# Patient Record
Sex: FEMALE | Race: WHITE | NOT HISPANIC OR LATINO | Employment: UNEMPLOYED | URBAN - METROPOLITAN AREA
[De-identification: names, ages, dates, MRNs, and addresses within clinical notes are randomized per-mention and may not be internally consistent; named-entity substitution may affect disease eponyms.]

---

## 2019-10-05 ENCOUNTER — HOSPITAL ENCOUNTER (INPATIENT)
Facility: HOSPITAL | Age: 49
LOS: 5 days | Discharge: HOME | End: 2019-10-10
Attending: EMERGENCY MEDICINE | Admitting: HOSPITALIST

## 2019-10-05 ENCOUNTER — APPOINTMENT (OUTPATIENT)
Dept: CT IMAGING | Facility: HOSPITAL | Age: 49
End: 2019-10-05

## 2019-10-05 DIAGNOSIS — N20.1 URETERAL CALCULUS, RIGHT: ICD-10-CM

## 2019-10-05 DIAGNOSIS — T39.1X1A ACCIDENTAL ACETAMINOPHEN OVERDOSE, INITIAL ENCOUNTER: ICD-10-CM

## 2019-10-05 DIAGNOSIS — N23 RENAL COLIC ON RIGHT SIDE: Primary | ICD-10-CM

## 2019-10-05 DIAGNOSIS — R79.89 LFT ELEVATION: ICD-10-CM

## 2019-10-05 DIAGNOSIS — K75.9 HEPATITIS: ICD-10-CM

## 2019-10-05 LAB
ALBUMIN SERPL-MCNC: 4.2 G/DL (ref 3.5–5.2)
ALBUMIN/GLOB SERPL: 1.4 G/DL
ALP SERPL-CCNC: 148 U/L (ref 39–117)
ALT SERPL W P-5'-P-CCNC: 431 U/L (ref 1–33)
AMORPH URATE CRY URNS QL MICRO: ABNORMAL /HPF
ANION GAP SERPL CALCULATED.3IONS-SCNC: 14.8 MMOL/L (ref 5–15)
APAP SERPL-MCNC: 10 MCG/ML (ref 10–30)
AST SERPL-CCNC: 620 U/L (ref 1–32)
BACTERIA UR QL AUTO: ABNORMAL /HPF
BASOPHILS # BLD AUTO: 0.01 10*3/MM3 (ref 0–0.2)
BASOPHILS NFR BLD AUTO: 0.2 % (ref 0–1.5)
BILIRUB SERPL-MCNC: 0.7 MG/DL (ref 0.2–1.2)
BILIRUB UR QL STRIP: NEGATIVE
BUN BLD-MCNC: 8 MG/DL (ref 6–20)
BUN/CREAT SERPL: 6.2 (ref 7–25)
CALCIUM SPEC-SCNC: 8.9 MG/DL (ref 8.6–10.5)
CHLORIDE SERPL-SCNC: 102 MMOL/L (ref 98–107)
CLARITY UR: ABNORMAL
CO2 SERPL-SCNC: 20.2 MMOL/L (ref 22–29)
COLOR UR: ABNORMAL
CREAT BLD-MCNC: 1.29 MG/DL (ref 0.57–1)
DEPRECATED RDW RBC AUTO: 42.1 FL (ref 37–54)
EOSINOPHIL # BLD AUTO: 0.43 10*3/MM3 (ref 0–0.4)
EOSINOPHIL NFR BLD AUTO: 9.8 % (ref 0.3–6.2)
ERYTHROCYTE [DISTWIDTH] IN BLOOD BY AUTOMATED COUNT: 14 % (ref 12.3–15.4)
GFR SERPL CREATININE-BSD FRML MDRD: 44 ML/MIN/1.73
GLOBULIN UR ELPH-MCNC: 3 GM/DL
GLUCOSE BLD-MCNC: 108 MG/DL (ref 65–99)
GLUCOSE UR STRIP-MCNC: ABNORMAL MG/DL
HAV IGM SERPL QL IA: NORMAL
HBV CORE IGM SERPL QL IA: NORMAL
HBV SURFACE AG SERPL QL IA: NORMAL
HCT VFR BLD AUTO: 41.9 % (ref 34–46.6)
HCV AB SER DONR QL: NORMAL
HGB BLD-MCNC: 13.4 G/DL (ref 12–15.9)
HGB UR QL STRIP.AUTO: ABNORMAL
HYALINE CASTS UR QL AUTO: ABNORMAL /LPF
IMM GRANULOCYTES # BLD AUTO: 0.03 10*3/MM3 (ref 0–0.05)
IMM GRANULOCYTES NFR BLD AUTO: 0.7 % (ref 0–0.5)
INR PPP: 0.99 (ref 0.9–1.1)
KETONES UR QL STRIP: ABNORMAL
LEUKOCYTE ESTERASE UR QL STRIP.AUTO: ABNORMAL
LIPASE SERPL-CCNC: 20 U/L (ref 13–60)
LYMPHOCYTES # BLD AUTO: 0.85 10*3/MM3 (ref 0.7–3.1)
LYMPHOCYTES NFR BLD AUTO: 19.3 % (ref 19.6–45.3)
MCH RBC QN AUTO: 26.6 PG (ref 26.6–33)
MCHC RBC AUTO-ENTMCNC: 32 G/DL (ref 31.5–35.7)
MCV RBC AUTO: 83.1 FL (ref 79–97)
MONOCYTES # BLD AUTO: 0.38 10*3/MM3 (ref 0.1–0.9)
MONOCYTES NFR BLD AUTO: 8.6 % (ref 5–12)
NEUTROPHILS # BLD AUTO: 2.71 10*3/MM3 (ref 1.7–7)
NEUTROPHILS NFR BLD AUTO: 61.4 % (ref 42.7–76)
NITRITE UR QL STRIP: POSITIVE
NRBC BLD AUTO-RTO: 0 /100 WBC (ref 0–0.2)
PH UR STRIP.AUTO: 6.5 [PH] (ref 5–8)
PLATELET # BLD AUTO: 209 10*3/MM3 (ref 140–450)
PMV BLD AUTO: 9.4 FL (ref 6–12)
POTASSIUM BLD-SCNC: 4 MMOL/L (ref 3.5–5.2)
PROT SERPL-MCNC: 7.2 G/DL (ref 6–8.5)
PROT UR QL STRIP: ABNORMAL
PROTHROMBIN TIME: 12.8 SECONDS (ref 11.7–14.2)
RBC # BLD AUTO: 5.04 10*6/MM3 (ref 3.77–5.28)
RBC # UR: ABNORMAL /HPF
REF LAB TEST METHOD: ABNORMAL
SODIUM BLD-SCNC: 137 MMOL/L (ref 136–145)
SP GR UR STRIP: 1.02 (ref 1–1.03)
SQUAMOUS #/AREA URNS HPF: ABNORMAL /HPF
UROBILINOGEN UR QL STRIP: ABNORMAL
WBC NRBC COR # BLD: 4.41 10*3/MM3 (ref 3.4–10.8)
WBC UR QL AUTO: ABNORMAL /HPF

## 2019-10-05 PROCEDURE — 80074 ACUTE HEPATITIS PANEL: CPT | Performed by: EMERGENCY MEDICINE

## 2019-10-05 PROCEDURE — 85025 COMPLETE CBC W/AUTO DIFF WBC: CPT | Performed by: EMERGENCY MEDICINE

## 2019-10-05 PROCEDURE — 25010000002 KETOROLAC TROMETHAMINE PER 15 MG: Performed by: EMERGENCY MEDICINE

## 2019-10-05 PROCEDURE — 99284 EMERGENCY DEPT VISIT MOD MDM: CPT

## 2019-10-05 PROCEDURE — 83690 ASSAY OF LIPASE: CPT | Performed by: EMERGENCY MEDICINE

## 2019-10-05 PROCEDURE — 80053 COMPREHEN METABOLIC PANEL: CPT | Performed by: EMERGENCY MEDICINE

## 2019-10-05 PROCEDURE — 74176 CT ABD & PELVIS W/O CONTRAST: CPT

## 2019-10-05 PROCEDURE — 25010000003 ACETYLCYSTEINE PER 100 MG: Performed by: EMERGENCY MEDICINE

## 2019-10-05 PROCEDURE — 81001 URINALYSIS AUTO W/SCOPE: CPT | Performed by: EMERGENCY MEDICINE

## 2019-10-05 PROCEDURE — 25010000002 ONDANSETRON PER 1 MG: Performed by: EMERGENCY MEDICINE

## 2019-10-05 PROCEDURE — 25010000002 DIPHENHYDRAMINE PER 50 MG

## 2019-10-05 PROCEDURE — 85610 PROTHROMBIN TIME: CPT | Performed by: EMERGENCY MEDICINE

## 2019-10-05 PROCEDURE — 80307 DRUG TEST PRSMV CHEM ANLYZR: CPT | Performed by: EMERGENCY MEDICINE

## 2019-10-05 RX ORDER — KETOROLAC TROMETHAMINE 30 MG/ML
30 INJECTION, SOLUTION INTRAMUSCULAR; INTRAVENOUS ONCE
Status: COMPLETED | OUTPATIENT
Start: 2019-10-05 | End: 2019-10-05

## 2019-10-05 RX ORDER — DIPHENHYDRAMINE HCL 50 MG
50 CAPSULE ORAL NIGHTLY PRN
COMMUNITY
End: 2019-11-12

## 2019-10-05 RX ORDER — SULFAMETHOXAZOLE AND TRIMETHOPRIM 800; 160 MG/1; MG/1
1 TABLET ORAL 2 TIMES DAILY
COMMUNITY
Start: 2019-09-30 | End: 2019-10-10 | Stop reason: HOSPADM

## 2019-10-05 RX ORDER — PHENAZOPYRIDINE HYDROCHLORIDE 200 MG/1
200 TABLET, FILM COATED ORAL 3 TIMES DAILY
COMMUNITY
Start: 2019-09-30 | End: 2019-10-22

## 2019-10-05 RX ORDER — DIPHENHYDRAMINE HYDROCHLORIDE 50 MG/ML
25 INJECTION INTRAMUSCULAR; INTRAVENOUS ONCE
Status: COMPLETED | OUTPATIENT
Start: 2019-10-05 | End: 2019-10-05

## 2019-10-05 RX ORDER — DIPHENHYDRAMINE HYDROCHLORIDE 50 MG/ML
INJECTION INTRAMUSCULAR; INTRAVENOUS
Status: COMPLETED
Start: 2019-10-05 | End: 2019-10-05

## 2019-10-05 RX ORDER — SODIUM CHLORIDE 0.9 % (FLUSH) 0.9 %
10 SYRINGE (ML) INJECTION AS NEEDED
Status: DISCONTINUED | OUTPATIENT
Start: 2019-10-05 | End: 2019-10-10 | Stop reason: HOSPADM

## 2019-10-05 RX ORDER — ONDANSETRON 2 MG/ML
4 INJECTION INTRAMUSCULAR; INTRAVENOUS ONCE
Status: COMPLETED | OUTPATIENT
Start: 2019-10-05 | End: 2019-10-05

## 2019-10-05 RX ADMIN — DIPHENHYDRAMINE HYDROCHLORIDE 25 MG: 50 INJECTION INTRAMUSCULAR; INTRAVENOUS at 22:42

## 2019-10-05 RX ADMIN — DIPHENHYDRAMINE HYDROCHLORIDE 25 MG: 50 INJECTION, SOLUTION INTRAMUSCULAR; INTRAVENOUS at 22:42

## 2019-10-05 RX ADMIN — KETOROLAC TROMETHAMINE 30 MG: 30 INJECTION, SOLUTION INTRAMUSCULAR at 18:28

## 2019-10-05 RX ADMIN — ACETYLCYSTEINE 15300 MG: 6 INJECTION, SOLUTION INTRAVENOUS at 21:16

## 2019-10-05 RX ADMIN — ONDANSETRON 4 MG: 2 INJECTION INTRAMUSCULAR; INTRAVENOUS at 18:27

## 2019-10-05 RX ADMIN — SODIUM CHLORIDE 1000 ML: 9 INJECTION, SOLUTION INTRAVENOUS at 18:26

## 2019-10-05 NOTE — ED TRIAGE NOTES
Right flank pain since Monday went to [facility] given antibiotics for UTI pt looked at my chart states had blood in UA pt reports pain is worse. Pt denies hx of kidney stones.

## 2019-10-05 NOTE — ED NOTES
Pt c/o right flank pain that started on Monday. Pt reports vaginal pressure and burning with urination and was seen at urgent care and placed on abx. ptstates that pain has been moving. Reports n/v and blood in urine     Rosario Loja RN  10/05/19 1559

## 2019-10-06 PROBLEM — N39.0 UTI (URINARY TRACT INFECTION), BACTERIAL: Status: ACTIVE | Noted: 2019-10-06

## 2019-10-06 PROBLEM — R10.12 LEFT UPPER QUADRANT PAIN: Status: ACTIVE | Noted: 2019-10-05

## 2019-10-06 PROBLEM — A49.9 UTI (URINARY TRACT INFECTION), BACTERIAL: Status: ACTIVE | Noted: 2019-10-06

## 2019-10-06 PROBLEM — R00.0 TACHYCARDIA: Status: ACTIVE | Noted: 2019-10-06

## 2019-10-06 PROBLEM — I95.9 HYPOTENSION: Status: ACTIVE | Noted: 2019-10-06

## 2019-10-06 PROBLEM — K75.9 HEPATITIS: Status: ACTIVE | Noted: 2019-10-06

## 2019-10-06 PROBLEM — R79.89 LFT ELEVATION: Status: ACTIVE | Noted: 2019-10-06

## 2019-10-06 LAB
ALBUMIN SERPL-MCNC: 3.4 G/DL (ref 3.5–5.2)
ALBUMIN SERPL-MCNC: 3.5 G/DL (ref 3.5–5.2)
ALBUMIN/GLOB SERPL: 1.4 G/DL
ALBUMIN/GLOB SERPL: 1.6 G/DL
ALP SERPL-CCNC: 115 U/L (ref 39–117)
ALP SERPL-CCNC: 121 U/L (ref 39–117)
ALT SERPL W P-5'-P-CCNC: 356 U/L (ref 1–33)
ALT SERPL W P-5'-P-CCNC: 362 U/L (ref 1–33)
ANION GAP SERPL CALCULATED.3IONS-SCNC: 15.8 MMOL/L (ref 5–15)
ANION GAP SERPL CALCULATED.3IONS-SCNC: 9.1 MMOL/L (ref 5–15)
APAP SERPL-MCNC: <5 MCG/ML (ref 10–30)
AST SERPL-CCNC: 397 U/L (ref 1–32)
AST SERPL-CCNC: 422 U/L (ref 1–32)
BILIRUB SERPL-MCNC: 0.8 MG/DL (ref 0.2–1.2)
BILIRUB SERPL-MCNC: 1.1 MG/DL (ref 0.2–1.2)
BUN BLD-MCNC: 10 MG/DL (ref 6–20)
BUN BLD-MCNC: 8 MG/DL (ref 6–20)
BUN/CREAT SERPL: 8.2 (ref 7–25)
BUN/CREAT SERPL: 8.4 (ref 7–25)
CALCIUM SPEC-SCNC: 7.5 MG/DL (ref 8.6–10.5)
CALCIUM SPEC-SCNC: 7.6 MG/DL (ref 8.6–10.5)
CHLORIDE SERPL-SCNC: 104 MMOL/L (ref 98–107)
CHLORIDE SERPL-SCNC: 108 MMOL/L (ref 98–107)
CO2 SERPL-SCNC: 18.2 MMOL/L (ref 22–29)
CO2 SERPL-SCNC: 22.9 MMOL/L (ref 22–29)
CREAT BLD-MCNC: 0.97 MG/DL (ref 0.57–1)
CREAT BLD-MCNC: 1.19 MG/DL (ref 0.57–1)
DEPRECATED RDW RBC AUTO: 43.5 FL (ref 37–54)
ERYTHROCYTE [DISTWIDTH] IN BLOOD BY AUTOMATED COUNT: 14 % (ref 12.3–15.4)
GFR SERPL CREATININE-BSD FRML MDRD: 48 ML/MIN/1.73
GFR SERPL CREATININE-BSD FRML MDRD: 61 ML/MIN/1.73
GLOBULIN UR ELPH-MCNC: 2.2 GM/DL
GLOBULIN UR ELPH-MCNC: 2.5 GM/DL
GLUCOSE BLD-MCNC: 104 MG/DL (ref 65–99)
GLUCOSE BLD-MCNC: 90 MG/DL (ref 65–99)
HCT VFR BLD AUTO: 34.9 % (ref 34–46.6)
HGB BLD-MCNC: 11.2 G/DL (ref 12–15.9)
INR PPP: 1.01 (ref 0.9–1.1)
MCH RBC QN AUTO: 27.1 PG (ref 26.6–33)
MCHC RBC AUTO-ENTMCNC: 32.1 G/DL (ref 31.5–35.7)
MCV RBC AUTO: 84.3 FL (ref 79–97)
PLATELET # BLD AUTO: 168 10*3/MM3 (ref 140–450)
PMV BLD AUTO: 9.5 FL (ref 6–12)
POTASSIUM BLD-SCNC: 3.8 MMOL/L (ref 3.5–5.2)
POTASSIUM BLD-SCNC: 4.1 MMOL/L (ref 3.5–5.2)
PROT SERPL-MCNC: 5.7 G/DL (ref 6–8.5)
PROT SERPL-MCNC: 5.9 G/DL (ref 6–8.5)
PROTHROMBIN TIME: 13 SECONDS (ref 11.7–14.2)
RBC # BLD AUTO: 4.14 10*6/MM3 (ref 3.77–5.28)
SODIUM BLD-SCNC: 138 MMOL/L (ref 136–145)
SODIUM BLD-SCNC: 140 MMOL/L (ref 136–145)
WBC NRBC COR # BLD: 5.53 10*3/MM3 (ref 3.4–10.8)

## 2019-10-06 PROCEDURE — 85027 COMPLETE CBC AUTOMATED: CPT | Performed by: INTERNAL MEDICINE

## 2019-10-06 PROCEDURE — 80053 COMPREHEN METABOLIC PANEL: CPT | Performed by: INTERNAL MEDICINE

## 2019-10-06 PROCEDURE — 25010000002 DIPHENHYDRAMINE PER 50 MG: Performed by: NURSE PRACTITIONER

## 2019-10-06 PROCEDURE — 25010000002 CEFTRIAXONE PER 250 MG: Performed by: HOSPITALIST

## 2019-10-06 PROCEDURE — 80053 COMPREHEN METABOLIC PANEL: CPT | Performed by: HOSPITALIST

## 2019-10-06 PROCEDURE — 85610 PROTHROMBIN TIME: CPT | Performed by: INTERNAL MEDICINE

## 2019-10-06 PROCEDURE — 25010000002 ONDANSETRON PER 1 MG: Performed by: HOSPITALIST

## 2019-10-06 PROCEDURE — 99254 IP/OBS CNSLTJ NEW/EST MOD 60: CPT | Performed by: INTERNAL MEDICINE

## 2019-10-06 PROCEDURE — 63710000001 DIPHENHYDRAMINE PER 50 MG: Performed by: HOSPITALIST

## 2019-10-06 PROCEDURE — 25010000002 MORPHINE PER 10 MG: Performed by: HOSPITALIST

## 2019-10-06 PROCEDURE — 80307 DRUG TEST PRSMV CHEM ANLYZR: CPT | Performed by: HOSPITALIST

## 2019-10-06 RX ORDER — CEFTRIAXONE SODIUM 1 G/50ML
1 INJECTION, SOLUTION INTRAVENOUS EVERY 24 HOURS
Status: COMPLETED | OUTPATIENT
Start: 2019-10-06 | End: 2019-10-08

## 2019-10-06 RX ORDER — DIPHENHYDRAMINE HYDROCHLORIDE 50 MG/ML
12.5 INJECTION INTRAMUSCULAR; INTRAVENOUS ONCE
Status: COMPLETED | OUTPATIENT
Start: 2019-10-06 | End: 2019-10-06

## 2019-10-06 RX ORDER — PHENAZOPYRIDINE HYDROCHLORIDE 200 MG/1
200 TABLET, FILM COATED ORAL 3 TIMES DAILY
Status: DISCONTINUED | OUTPATIENT
Start: 2019-10-06 | End: 2019-10-07

## 2019-10-06 RX ORDER — SODIUM CHLORIDE 9 MG/ML
125 INJECTION, SOLUTION INTRAVENOUS CONTINUOUS
Status: DISCONTINUED | OUTPATIENT
Start: 2019-10-06 | End: 2019-10-10 | Stop reason: HOSPADM

## 2019-10-06 RX ORDER — DIPHENHYDRAMINE HCL 25 MG
50 CAPSULE ORAL NIGHTLY PRN
Status: DISCONTINUED | OUTPATIENT
Start: 2019-10-06 | End: 2019-10-10 | Stop reason: HOSPADM

## 2019-10-06 RX ORDER — ONDANSETRON 4 MG/1
4 TABLET, FILM COATED ORAL EVERY 6 HOURS PRN
Status: DISCONTINUED | OUTPATIENT
Start: 2019-10-06 | End: 2019-10-10 | Stop reason: HOSPADM

## 2019-10-06 RX ORDER — SODIUM CHLORIDE 0.9 % (FLUSH) 0.9 %
10 SYRINGE (ML) INJECTION EVERY 12 HOURS SCHEDULED
Status: DISCONTINUED | OUTPATIENT
Start: 2019-10-06 | End: 2019-10-10 | Stop reason: HOSPADM

## 2019-10-06 RX ORDER — SODIUM CHLORIDE 0.9 % (FLUSH) 0.9 %
10 SYRINGE (ML) INJECTION AS NEEDED
Status: DISCONTINUED | OUTPATIENT
Start: 2019-10-06 | End: 2019-10-10 | Stop reason: HOSPADM

## 2019-10-06 RX ORDER — ONDANSETRON 2 MG/ML
4 INJECTION INTRAMUSCULAR; INTRAVENOUS EVERY 6 HOURS PRN
Status: DISCONTINUED | OUTPATIENT
Start: 2019-10-06 | End: 2019-10-10 | Stop reason: HOSPADM

## 2019-10-06 RX ORDER — FAMOTIDINE 10 MG/ML
20 INJECTION, SOLUTION INTRAVENOUS EVERY 12 HOURS SCHEDULED
Status: DISCONTINUED | OUTPATIENT
Start: 2019-10-06 | End: 2019-10-10 | Stop reason: HOSPADM

## 2019-10-06 RX ORDER — MORPHINE SULFATE 2 MG/ML
2 INJECTION, SOLUTION INTRAMUSCULAR; INTRAVENOUS EVERY 4 HOURS PRN
Status: DISCONTINUED | OUTPATIENT
Start: 2019-10-06 | End: 2019-10-08

## 2019-10-06 RX ADMIN — MORPHINE SULFATE 2 MG: 2 INJECTION, SOLUTION INTRAMUSCULAR; INTRAVENOUS at 13:49

## 2019-10-06 RX ADMIN — SODIUM CHLORIDE 125 ML/HR: 9 INJECTION, SOLUTION INTRAVENOUS at 01:16

## 2019-10-06 RX ADMIN — SODIUM CHLORIDE, PRESERVATIVE FREE 10 ML: 5 INJECTION INTRAVENOUS at 10:01

## 2019-10-06 RX ADMIN — SODIUM CHLORIDE, PRESERVATIVE FREE 10 ML: 5 INJECTION INTRAVENOUS at 02:37

## 2019-10-06 RX ADMIN — SODIUM CHLORIDE 1000 ML: 9 INJECTION, SOLUTION INTRAVENOUS at 00:20

## 2019-10-06 RX ADMIN — ONDANSETRON 4 MG: 2 INJECTION INTRAMUSCULAR; INTRAVENOUS at 07:44

## 2019-10-06 RX ADMIN — DIPHENHYDRAMINE HYDROCHLORIDE 50 MG: 25 CAPSULE ORAL at 21:26

## 2019-10-06 RX ADMIN — SODIUM CHLORIDE, PRESERVATIVE FREE 10 ML: 5 INJECTION INTRAVENOUS at 00:43

## 2019-10-06 RX ADMIN — DIPHENHYDRAMINE HYDROCHLORIDE 12.5 MG: 50 INJECTION, SOLUTION INTRAMUSCULAR; INTRAVENOUS at 01:05

## 2019-10-06 RX ADMIN — MORPHINE SULFATE 2 MG: 2 INJECTION, SOLUTION INTRAMUSCULAR; INTRAVENOUS at 02:32

## 2019-10-06 RX ADMIN — SODIUM CHLORIDE 125 ML/HR: 9 INJECTION, SOLUTION INTRAVENOUS at 16:28

## 2019-10-06 RX ADMIN — MORPHINE SULFATE 2 MG: 2 INJECTION, SOLUTION INTRAMUSCULAR; INTRAVENOUS at 18:59

## 2019-10-06 RX ADMIN — PHENAZOPYRIDINE 200 MG: 200 TABLET ORAL at 17:23

## 2019-10-06 RX ADMIN — MORPHINE SULFATE 2 MG: 2 INJECTION, SOLUTION INTRAMUSCULAR; INTRAVENOUS at 07:58

## 2019-10-06 RX ADMIN — ONDANSETRON 4 MG: 2 INJECTION INTRAMUSCULAR; INTRAVENOUS at 00:43

## 2019-10-06 RX ADMIN — CEFTRIAXONE SODIUM 1 G: 1 INJECTION, SOLUTION INTRAVENOUS at 00:41

## 2019-10-06 RX ADMIN — FAMOTIDINE 20 MG: 10 INJECTION INTRAVENOUS at 21:31

## 2019-10-06 RX ADMIN — SODIUM CHLORIDE 125 ML/HR: 9 INJECTION, SOLUTION INTRAVENOUS at 08:00

## 2019-10-06 RX ADMIN — FAMOTIDINE 20 MG: 10 INJECTION INTRAVENOUS at 10:00

## 2019-10-06 RX ADMIN — SODIUM CHLORIDE, PRESERVATIVE FREE 10 ML: 5 INJECTION INTRAVENOUS at 21:27

## 2019-10-06 RX ADMIN — FAMOTIDINE 20 MG: 10 INJECTION INTRAVENOUS at 02:31

## 2019-10-06 RX ADMIN — PHENAZOPYRIDINE 200 MG: 200 TABLET ORAL at 21:26

## 2019-10-06 NOTE — CONSULTS
[facility]  Initial Inpatient Consult Note    Referring Provider: Dr. Corbin    Reason for Consultation: Elevated liver tests    Subjective     History of present illness:    49 y.o. female with a history of kidney stones who has been taking Tylenol roughly 2 g a day for the last 5 days, occasionally more doses, had nausea vomiting abdominal discomfort in the right upper quadrant.  Dysuria, may have been bloody urine, came to the emergency room.  Found to have elevated liver test.  She is also been taking Pyridium as well as Bactrim for UTI.  She is never had problems with her liver before.  Tylenol level in the emergency room was 10, N-acetylcysteine was started but then discontinued.  At the time I am seeing her now 12 hours later she is asymptomatic.    She does not use alcohol or illicit drugs.  No other hepatotoxic medications her med list    Past Medical History:  Past Medical History:   Diagnosis Date   • Arthritis    • GERD (gastroesophageal reflux disease)    • History of transfusion    • Hypotension    • IBS (irritable bowel syndrome)    • PONV (postoperative nausea and vomiting)      Past Surgical History:  Past Surgical History:   Procedure Laterality Date   • ANKLE SURGERY Right    • BACK SURGERY      herniated disc removal   •  SECTION     • CHOLECYSTECTOMY     • COLONOSCOPY     • ENDOSCOPY     • JOINT REPLACEMENT     • KNEE SURGERY Bilateral     x3 each   • SHOULDER SURGERY Left     x2   • SKIN BIOPSY        Social History:   Social History     Tobacco Use   • Smoking status: Never Smoker   • Smokeless tobacco: Never Used   Substance Use Topics   • Alcohol use: No     Frequency: Never      Family History:  History reviewed. No pertinent family history.    Home Meds:  Medications Prior to Admission   Medication Sig Dispense Refill Last Dose   • Acetaminophen (ACETAMIN PO) Take 650 mg by mouth Every 6 (Six) Hours As Needed.      • diphenhydrAMINE (BENADRYL) 50 MG capsule  Take 50 mg by mouth At Night As Needed.      • phenazopyridine (PYRIDIUM) 200 MG tablet Take 200 mg by mouth 3 (Three) Times a Day.      • sulfamethoxazole-trimethoprim (BACTRIM DS,SEPTRA DS) 800-160 MG per tablet Take 1 tablet by mouth 2 (Two) Times a Day.        Current Meds:     ceftriaxone 1 g Intravenous Q24H   famotidine 20 mg Intravenous Q12H   sodium chloride 10 mL Intravenous Q12H     Allergies:  Allergies   Allergen Reactions   • Egg White [Albumen, Egg] Anaphylaxis   • Erythromycin Diarrhea, Nausea And Vomiting and GI Intolerance   • Lactose Intolerance (Gi) Diarrhea   • Meperidine Other (See Comments)     Bradycardia & lightheadedness   • Orange Juice Diarrhea     Review of Systems  She has weakness fatigue all other systems reviewed and negative     Objective     Vital Signs  Temp:  [97.7 °F (36.5 °C)-99.3 °F (37.4 °C)] 98.2 °F (36.8 °C)  Heart Rate:  [] 91  Resp:  [16-21] 16  BP: ()/() 99/51  Physical Exam:  General Appearance:    Alert, cooperative, in no acute distress   Head:    Normocephalic, without obvious abnormality, atraumatic   Eyes:          conjunctivae and sclerae normal, no   icterus   Throat:   no thrush, oral mucosa moist   Neck:   Supple, no adenopathy   Lungs:     Clear to auscultation bilaterally    Heart:    Regular rhythm and normal rate    Chest Wall:    No abnormalities observed   Abdomen:     Soft, nondistended, nontender; normal bowel sounds   Extremities:   no edema, no redness   Skin:   No bruising or rash   Psychiatric:  normal mood and insight     Results Review:   I reviewed the patient's new clinical results.    Results from last 7 days   Lab Units 10/05/19  1821   WBC 10*3/mm3 4.41   HEMOGLOBIN g/dL 13.4   HEMATOCRIT % 41.9   PLATELETS 10*3/mm3 209     Results from last 7 days   Lab Units 10/06/19  0319 10/05/19  1821   SODIUM mmol/L 138 137   POTASSIUM mmol/L 3.8 4.0   CHLORIDE mmol/L 104 102   CO2 mmol/L 18.2* 20.2*   BUN mg/dL 10 8   CREATININE mg/dL  1.19* 1.29*   CALCIUM mg/dL 7.5* 8.9   BILIRUBIN mg/dL 1.1 0.7   ALK PHOS U/L 115 148*   ALT (SGPT) U/L 362* 431*   AST (SGOT) U/L 422* 620*   GLUCOSE mg/dL 104* 108*     Results from last 7 days   Lab Units 10/05/19  2050   INR  0.99     Lab Results   Lab Value Date/Time    LIPASE 20 10/05/2019 1821       Radiology:  CT Abdomen Pelvis Without Contrast   Final Result   1.  Fatty liver with 14 mm low-density right hepatic lobe lesion.   Follow-up recommended.   2. 2.9 cm parapelvic left renal cyst.   3. Mild diverticulosis.   4. Probable collection of tiny stones in the distal right ureter as   discussed. No significant hydronephrosis                   This report was finalized on 10/5/2019 11:00 PM by Felipe Jacobsen M.D.              Assessment/Plan   Patient Active Problem List   Diagnosis   • Left upper quadrant pain   • Hepatitis   • LFT elevation   • UTI (urinary tract infection), bacterial   • Hypotension   • Tachycardia       Assessment:  1. Elevated liver tests  2. Tylenol usage    Plan:  · I will recheck liver test this afternoon if they continue to improve and INR is normal we can hold on any further treatment  · Supportive care  · If the numbers are worsening I am reinitiating N-acetylcysteine      I discussed the patients findings and my recommendations with patient and nursing staff.    Griffin Mckeon MD

## 2019-10-06 NOTE — ED PROVIDER NOTES
EMERGENCY DEPARTMENT ENCOUNTER    Room Number:    Date of encounter:  10/5/2019  PCP: Xu Kyle MD  Historian: Patient      HPI:  Chief Complaint: Right flank pain and dysuria  A complete HPI/ROS/PMH/PSH/SH/FH are unobtainable due to: Nothing    Context: Val Pettit is a 49 y.o. female who presents to the ED c/o right flank pain and dysuria for several days now.  The pain is intermittent, nonradiating, and severe at its worse.  She said no fever, but feels like she has had blood in her urine.    Patient was seen in [facility] a few days ago, and received antibiotics and Pyridium, which helped initially with the symptoms but is now returned.  Patient also states she is been taking large doses of acetaminophen over-the-counter for the pain.  She is not sure exactly how much she is been taking, but it sounds like every 4 hours she is been taking as many as 3 Tylenol extra strength, which could be up to 10 g/day for the last 4 days.      PAST MEDICAL HISTORY  Active Ambulatory Problems     Diagnosis Date Noted   • No Active Ambulatory Problems     Resolved Ambulatory Problems     Diagnosis Date Noted   • No Resolved Ambulatory Problems     No Additional Past Medical History         PAST SURGICAL HISTORY  Past Surgical History:   Procedure Laterality Date   • ANKLE SURGERY Right    • BACK SURGERY     •  SECTION     • CHOLECYSTECTOMY     • KNEE SURGERY Bilateral     x3 each   • SHOULDER SURGERY Left     x2         FAMILY HISTORY  History reviewed. No pertinent family history.      SOCIAL HISTORY  Social History     Socioeconomic History   • Marital status:      Spouse name: Not on file   • Number of children: Not on file   • Years of education: Not on file   • Highest education level: Not on file   Tobacco Use   • Smoking status: Never Smoker   • Smokeless tobacco: Never Used   Substance and Sexual Activity   • Alcohol use: No     Frequency: Never   • Drug use: No          ALLERGIES  Erythromycin and Meperidine        REVIEW OF SYSTEMS  Review of Systems     All systems reviewed and negative except for those discussed in HPI.       PHYSICAL EXAM    I have reviewed the triage vital signs and nursing notes.    ED Triage Vitals   Temp Heart Rate Resp BP SpO2   10/05/19 1756 10/05/19 1756 10/05/19 1756 10/05/19 1812 10/05/19 1756   99.3 °F (37.4 °C) (!) 121 18 138/83 93 %      Temp src Heart Rate Source Patient Position BP Location FiO2 (%)   10/05/19 1756 -- -- -- --   Tympanic           Physical Exam  GENERAL: not distressed  HENT: nares patent  EYES: no scleral icterus  CV: regular rhythm, regular rate  RESPIRATORY: normal effort  ABDOMEN: soft, mild right upper quadrant tenderness and hepatomegaly but no rebound or guarding  MUSCULOSKELETAL: no deformity  NEURO: alert, moves all extremities, follows commands  SKIN: warm, dry        LAB RESULTS  Recent Results (from the past 24 hour(s))   Comprehensive Metabolic Panel    Collection Time: 10/05/19  6:21 PM   Result Value Ref Range    Glucose 108 (H) 65 - 99 mg/dL    BUN 8 6 - 20 mg/dL    Creatinine 1.29 (H) 0.57 - 1.00 mg/dL    Sodium 137 136 - 145 mmol/L    Potassium 4.0 3.5 - 5.2 mmol/L    Chloride 102 98 - 107 mmol/L    CO2 20.2 (L) 22.0 - 29.0 mmol/L    Calcium 8.9 8.6 - 10.5 mg/dL    Total Protein 7.2 6.0 - 8.5 g/dL    Albumin 4.20 3.50 - 5.20 g/dL    ALT (SGPT) 431 (H) 1 - 33 U/L    AST (SGOT) 620 (H) 1 - 32 U/L    Alkaline Phosphatase 148 (H) 39 - 117 U/L    Total Bilirubin 0.7 0.2 - 1.2 mg/dL    eGFR Non African Amer 44 (L) >60 mL/min/1.73    Globulin 3.0 gm/dL    A/G Ratio 1.4 g/dL    BUN/Creatinine Ratio 6.2 (L) 7.0 - 25.0    Anion Gap 14.8 5.0 - 15.0 mmol/L   CBC Auto Differential    Collection Time: 10/05/19  6:21 PM   Result Value Ref Range    WBC 4.41 3.40 - 10.80 10*3/mm3    RBC 5.04 3.77 - 5.28 10*6/mm3    Hemoglobin 13.4 12.0 - 15.9 g/dL    Hematocrit 41.9 34.0 - 46.6 %    MCV 83.1 79.0 - 97.0 fL    MCH 26.6  26.6 - 33.0 pg    MCHC 32.0 31.5 - 35.7 g/dL    RDW 14.0 12.3 - 15.4 %    RDW-SD 42.1 37.0 - 54.0 fl    MPV 9.4 6.0 - 12.0 fL    Platelets 209 140 - 450 10*3/mm3    Neutrophil % 61.4 42.7 - 76.0 %    Lymphocyte % 19.3 (L) 19.6 - 45.3 %    Monocyte % 8.6 5.0 - 12.0 %    Eosinophil % 9.8 (H) 0.3 - 6.2 %    Basophil % 0.2 0.0 - 1.5 %    Immature Grans % 0.7 (H) 0.0 - 0.5 %    Neutrophils, Absolute 2.71 1.70 - 7.00 10*3/mm3    Lymphocytes, Absolute 0.85 0.70 - 3.10 10*3/mm3    Monocytes, Absolute 0.38 0.10 - 0.90 10*3/mm3    Eosinophils, Absolute 0.43 (H) 0.00 - 0.40 10*3/mm3    Basophils, Absolute 0.01 0.00 - 0.20 10*3/mm3    Immature Grans, Absolute 0.03 0.00 - 0.05 10*3/mm3    nRBC 0.0 0.0 - 0.2 /100 WBC   Lipase    Collection Time: 10/05/19  6:21 PM   Result Value Ref Range    Lipase 20 13 - 60 U/L   Acetaminophen Level    Collection Time: 10/05/19  6:21 PM   Result Value Ref Range    Acetaminophen 10.0 10.0 - 30.0 mcg/mL   Urinalysis With Microscopic If Indicated (No Culture) - Urine, Clean Catch    Collection Time: 10/05/19  6:22 PM   Result Value Ref Range    Color, UA Red (A) Yellow, Straw    Appearance, UA Turbid (A) Clear    pH, UA 6.5 5.0 - 8.0    Specific Gravity, UA 1.020 1.005 - 1.030    Glucose, UA >=1000 mg/dL (3+) (A) Negative    Ketones, UA 15 mg/dL (1+) (A) Negative    Bilirubin, UA Negative Negative    Blood, UA Moderate (2+) (A) Negative    Protein, UA >=300 mg/dL (3+) (A) Negative    Leuk Esterase, UA Moderate (2+) (A) Negative    Nitrite, UA Positive (A) Negative    Urobilinogen, UA >=8.0 E.U./dL (A) 0.2 - 1.0 E.U./dL   Urinalysis, Microscopic Only - Urine, Clean Catch    Collection Time: 10/05/19  6:22 PM   Result Value Ref Range    RBC, UA 6-12 (A) None Seen, 0-2 /HPF    WBC, UA 3-5 (A) None Seen, 0-2 /HPF    Bacteria, UA Trace (A) None Seen /HPF    Squamous Epithelial Cells, UA None Seen None Seen, 0-2 /HPF    Hyaline Casts, UA None Seen None Seen /LPF    Amorphous Crystals, UA Large/3+ None  Seen /HPF    Methodology Manual Light Microscopy    Hepatitis Panel, Acute    Collection Time: 10/05/19  8:50 PM   Result Value Ref Range    Hepatitis B Surface Ag Non-Reactive Non-Reactive    Hep A IgM Non-Reactive Non-Reactive    Hep B C IgM Non-Reactive Non-Reactive    Hepatitis C Ab Non-Reactive Non-Reactive   Protime-INR    Collection Time: 10/05/19  8:50 PM   Result Value Ref Range    Protime 12.8 11.7 - 14.2 Seconds    INR 0.99 0.90 - 1.10       Ordered the above labs and independently reviewed the results.        RADIOLOGY  Ct Abdomen Pelvis Without Contrast    Result Date: 10/5/2019  NONCONTRAST CT SCANS ABDOMEN AND PELVIS  HISTORY: Flank pain hematuria  COMPARISON: None.  TECHNIQUE:Radiation dose reduction techniques were utilized, including automated exposure control and exposure modulation based on body size. Axial images were obtained from the lung bases to the symphysis pubis without oral or IV contrast per request.  FINDINGS:  Diffuse fatty liver. There is a 14 mm low density liver lesion inferiorly on image 56 which is too small for detailed assessment without contrast. It does not appear to reflect a simple cyst. Follow-up recommended. There is a 2.9 cm parapelvic left renal cyst. Best seen on coronal image 102, is a linear hyperdensity in the distal right ureter likely a collection of tiny stones approximately 1.-1.5 cm proximal to the UVJ. There is no significant right hydronephrosis or hydroureter.. Remaining solid organs are otherwise unremarkable without benefit of IV contrast. Normal aorta and appendix. Mild diverticulosis.       1.  Fatty liver with 14 mm low-density right hepatic lobe lesion. Follow-up recommended. 2. 2.9 cm parapelvic left renal cyst. 3. Mild diverticulosis. 4. Probable collection of tiny stones in the distal right ureter as discussed. No significant hydronephrosis             I ordered the above noted radiological studies. Reviewed by me and discussed with radiologist.   See dictation for official radiology interpretation.      PROCEDURES    Procedures      MEDICATIONS GIVEN IN ER    Medications   sodium chloride 0.9 % flush 10 mL (not administered)   acetylcysteine (ACETADOTE) 15,300 mg in dextrose (D5W) 5 % 200 mL infusion (15,300 mg Intravenous New Bag 10/5/19 2116)   sodium chloride 0.9 % bolus 1,000 mL (1,000 mL Intravenous New Bag 10/5/19 1826)   ketorolac (TORADOL) injection 30 mg (30 mg Intravenous Given 10/5/19 1828)   ondansetron (ZOFRAN) injection 4 mg (4 mg Intravenous Given 10/5/19 1827)         PROGRESS, DATA ANALYSIS, CONSULTS, AND MEDICAL DECISION MAKING    All labs have been independently reviewed by me.  All radiology studies have been reviewed by me and discussed with radiologist dictating the report.   EKG's independently viewed and interpreted by me.  Discussion below represents my analysis of pertinent findings related to patient's condition, differential diagnosis, treatment plan and final disposition.      ED Course as of Oct 05 2150   Sat Oct 05, 2019   2148 Labs show normal CBC, elevated liver enzymes, negative hepatitis screen, microscopic hematuria which has nitrite positive likely due to the Pyridium.    She does have some mild MITRA  [DP]   2149 Her acetaminophen level is 10, and her INR is normal  [DP]   2149 CT the abdomen pelvis shows fatty infiltration of the liver with a non-differentiated liver lesion.    Also shows possibility of a small collection of tiny uroliths at the right UVJ with no hydronephrosis.  [DP]   2149 In the setting of the history and the abnormal liver enzymes, I am going to initiate Acetadote therapy even though the Sinemet at the level is 0.  Ongoing ingestion at that dose could certainly cause subacute hepatotoxicity.  [DP]   2150 Discussed case with Dr. Corbin from [facility], who agrees to admit the patient to a telemetry bed for further evaluation treatment  [DP]      ED Course User Index  [DP] Dillon Villead MD       AS OF 9:50  PM VITALS:    BP - 125/91  HR - 95  TEMP - 99.3 °F (37.4 °C) (Tympanic)  02 SATS - 96%        DIAGNOSIS  Final diagnoses:   Renal colic on right side   Hepatitis   Accidental acetaminophen overdose, initial encounter         DISPOSITION  Admit         Dillon Villeda MD  10/05/19 0561

## 2019-10-06 NOTE — H&P
HISTORY AND PHYSICAL   [facility]        Patient Identification:  Name: Val Pettit  Age: 49 y.o.  Sex: female  :  1970  MRN: [id number]                     Primary Care Physician: Xu Kyle MD    Chief Complaint: Abdominal/flank pain    History of Present Illness:   Ms Pettit is a 49-year-old female who claims to have a past medical history of IBS that she normally avoids NSAIDs.  She states she has had dysuria and some flank pain and as a result she is been taking excessive amounts of Tylenol.  She states that she has been taking at least 500 mg every 4 hours.  At times she is even taking doses up to 1500 mg.  Patient was aware that this was definitely above suggested recommendations but continue to take them regardless.  As result she has had issues of nausea vomiting and abdominal discomfort.  She states that she also has dysuria and what she thought is been bloody urine and her urinalysis seems to be consistent with infection.  She went to [facility] couple days prior and was prescribed Pyridium as well as Bactrim.  She denies fever chills night sweats or altered mentation.  Initially she was started on acetylcysteine protocol due to concerns for liver failure as her LFTs were elevated in the ER to 431/620/148 for ALT AST and alkaline phosphatase respectively.  Her bilirubin is normal at 0.7 and a coagulation panel is unremarkable.  Her Tylenol level came back normal at 10 so no further acetylcysteine protocol was continued.    Past Medical History:  History reviewed. No pertinent past medical history.  Past Surgical History:  Past Surgical History:   Procedure Laterality Date   • ANKLE SURGERY Right    • BACK SURGERY     •  SECTION     • CHOLECYSTECTOMY     • KNEE SURGERY Bilateral     x3 each   • SHOULDER SURGERY Left     x2      Home Meds:  Medications Prior to Admission   Medication Sig Dispense Refill Last Dose   • Acetaminophen (ACETAMIN PO) Take 650  mg by mouth Every 6 (Six) Hours As Needed.      • diphenhydrAMINE (BENADRYL) 50 MG capsule Take 50 mg by mouth At Night As Needed.      • phenazopyridine (PYRIDIUM) 200 MG tablet Take 200 mg by mouth 3 (Three) Times a Day.      • sulfamethoxazole-trimethoprim (BACTRIM DS,SEPTRA DS) 800-160 MG per tablet Take 1 tablet by mouth 2 (Two) Times a Day.          Allergies:  Allergies   Allergen Reactions   • Erythromycin Diarrhea, Nausea And Vomiting and GI Intolerance   • Lactose Intolerance (Gi) Diarrhea   • Meperidine Other (See Comments)     Bradycardia & lightheadedness   • Orange Juice Diarrhea     Immunizations:    There is no immunization history on file for this patient.  Social History:   Social History     Social History Narrative   • Not on file     Social History     Socioeconomic History   • Marital status:      Spouse name: Not on file   • Number of children: Not on file   • Years of education: Not on file   • Highest education level: Not on file   Tobacco Use   • Smoking status: Never Smoker   • Smokeless tobacco: Never Used   Substance and Sexual Activity   • Alcohol use: No     Frequency: Never   • Drug use: No       Family History:  History reviewed. No pertinent family history.     Review of Systems  See history of present illness and past medical history.  Patient denies fever chills night sweats.  Admits to nausea vomiting and abdominal discomfort.  Denies any altered mentation changes to vision smell taste or sound chest pain palpitations cough shortness of breath.  Admits to dysuria and increased frequency and even think she is had some mild hematuria.  Denies any additional bruising bleeding.  Admits to global discomfort and agitation but denies any loss of consciousness or focal loss of function.  Remainder of ROS is negative.    Objective:  tMax 24 hrs: Temp (24hrs), Av.5 °F (36.9 °C), Min:97.7 °F (36.5 °C), Max:99.3 °F (37.4 °C)    Vitals Ranges:   Temp:  [97.7 °F (36.5 °C)-99.3 °F  "(37.4 °C)] 97.7 °F (36.5 °C)  Heart Rate:  [] 112  Resp:  [16-21] 21  BP: ()/() 92/60      Exam:  BP 92/60 (BP Location: Left arm, Patient Position: Lying)   Pulse 112   Temp 97.7 °F (36.5 °C) (Oral)   Resp 21   Ht 162.6 cm (64\")   Wt 102 kg (224 lb)   SpO2 96%   BMI 38.45 kg/m²     General Appearance:    Alert, cooperative, nontoxic-appearing/no distress though dramatic, AOx3, no jaundice   Head:    Normocephalic, without obvious abnormality, atraumatic   Eyes:    PERRL, no scleral icterus, EOM's intact, both eyes   Ears:    Normal external ear canals, both ears   Nose:   Nares normal, septum midline, mucosa normal, no drainage    or sinus tenderness   Throat:   Lips, mucosa, and tongue normal though mucous membranes are dry   Neck:   Supple,no JVD       Lungs:     Clear to auscultation bilaterally, respirations unlabored        Heart:    Tachy rate and rhythm, S1 and S2 normal   Abdomen:     Soft, some generalized tenderness greatest around upper quadrants but no rebound guarding, bowel sounds active all four quadrants, morbidly obese with a BMI of 39, no masses, no hepatomegaly, no splenomegaly   Extremities:  Moving all, no cyanosis or edema   Pulses:   2+ and symmetric all extremities   Skin:  No jaundice       Neurologic:   CNII-XII intact, normal strength, no focal deficit      .    Data Review:  Labs in chart were reviewed.             Imaging Results (all)     Procedure Component Value Units Date/Time    CT Abdomen Pelvis Without Contrast [[id number]] Collected:  10/05/19 2022     Updated:  10/05/19 2303    Narrative:       NONCONTRAST CT SCANS ABDOMEN AND PELVIS     HISTORY: Flank pain hematuria     COMPARISON: None.     TECHNIQUE:Radiation dose reduction techniques were utilized, including  automated exposure control and exposure modulation based on body size.   Axial images were obtained from the lung bases to the symphysis pubis  without oral or IV contrast per request.    "   FINDINGS:  Diffuse fatty liver. There is a 14 mm low density liver  lesion inferiorly on image 56 which is too small for detailed assessment  without contrast. It does not appear to reflect a simple cyst. Follow-up  recommended. There is a 2.9 cm parapelvic left renal cyst. Best seen on  coronal image 102, is a linear hyperdensity in the distal right ureter  likely a collection of tiny stones approximately 1.-1.5 cm proximal to  the UVJ. There is no significant right hydronephrosis or hydroureter..  Remaining solid organs are otherwise unremarkable without benefit of IV  contrast. Normal aorta and appendix. Mild diverticulosis.          Impression:       1.  Fatty liver with 14 mm low-density right hepatic lobe lesion.  Follow-up recommended.  2. 2.9 cm parapelvic left renal cyst.  3. Mild diverticulosis.  4. Probable collection of tiny stones in the distal right ureter as  discussed. No significant hydronephrosis              This report was finalized on 10/5/2019 11:00 PM by Felipe Jacobsen M.D.               Assessment:    Hepatitis    Left upper quadrant pain    LFT elevation    UTI (urinary tract infection), bacterial    Hypotension    Tachycardia      Plan:    Acute hepatitis likely influenced by obesity given steatosis noted on CT as well as exogenous/excessive use of Tylenol   -Acetylcysteine protocol initiated in ER though initial Tylenol level is unremarkable so we will hold this further overnight   -Trend LFTs -hepatitis panel negative   -Consult GI for additional recommendations   -Okay with morphine the first 24 to 48 hours for pain control    IVF bolus then maintenance for hypotension which should also help correct tachycardia though no signs of sepsis present, patient does seem to have a urinary tract infection as well as complaints of dysuria and increased frequency and will initiate Rocephin.    SCDs for DVT prophylaxis    IV Pepcid for GERD/GI prophylaxis    Patient seen and examined just prior to  midnight on 10/5/2019 though this dictation fall shortly afterwards    Anthony Corbin MD  10/6/2019  12:18 AM

## 2019-10-06 NOTE — NURSING NOTE
EVS called for new bed. Patient's  at bedside, frustrated that patient's bed is not functioning properly and new bed still has not arrived despite calls from day shift RN.

## 2019-10-06 NOTE — PLAN OF CARE
Problem: Patient Care Overview  Goal: Plan of Care Review  Outcome: Ongoing (interventions implemented as appropriate)   10/06/19 0324   Coping/Psychosocial   Plan of Care Reviewed With patient   Plan of Care Review   Progress improving   OTHER   Outcome Summary N/V improved after IV zofran. Itching improved with IV benadryl. PRN morphine for pain. GI to see PT at AM. IV AB for UTI. HR tachy. otherwise VSS. awaiting AM labs.      Goal: Individualization and Mutuality  Outcome: Ongoing (interventions implemented as appropriate)    Goal: Discharge Needs Assessment  Outcome: Ongoing (interventions implemented as appropriate)    Goal: Interprofessional Rounds/Family Conf  Outcome: Ongoing (interventions implemented as appropriate)      Problem: Pain, Chronic (Adult)  Goal: Identify Related Risk Factors and Signs and Symptoms  Outcome: Ongoing (interventions implemented as appropriate)    Goal: Acceptable Pain/Comfort Level and Functional Ability  Outcome: Ongoing (interventions implemented as appropriate)

## 2019-10-06 NOTE — PROGRESS NOTES
Name: Val Pettit ADMIT: 10/5/2019   : 1970  PCP: Xu Kyle MD    MRN: [id number] LOS: 1 days   AGE/SEX: 49 y.o. female  ROOM: [facility]     Subjective   Subjective   CC: abdominal/flank pain  No acute events. Abdominal symptoms have much improved.  No new complaints.  Taking PO. No CP/dyspnea/f/c. +nausea, no vomiting or diarrhea.    Objective   Objective   Vital Signs  Temp:  [97.7 °F (36.5 °C)-99.3 °F (37.4 °C)] 98.2 °F (36.8 °C)  Heart Rate:  [] 91  Resp:  [16-21] 16  BP: ()/() 99/51  SpO2:  [92 %-96 %] 94 %  on   ;   Device (Oxygen Therapy): room air  Body mass index is 38.06 kg/m².  Physical Exam   Constitutional: She is oriented to person, place, and time. No distress.   HENT:   Head: Normocephalic and atraumatic.   Mouth/Throat: Oropharynx is clear and moist.   Eyes: Conjunctivae and EOM are normal. Pupils are equal, round, and reactive to light.   Neck: Normal range of motion. Neck supple.   Cardiovascular: Normal rate, regular rhythm and intact distal pulses.   Pulmonary/Chest: Effort normal and breath sounds normal.   Abdominal: Soft. Bowel sounds are normal. There is no tenderness.   Musculoskeletal: She exhibits no edema or tenderness.   Neurological: She is alert and oriented to person, place, and time.   Skin: Skin is warm and dry. She is not diaphoretic.   Psychiatric: She has a normal mood and affect. Her behavior is normal.   Nursing note and vitals reviewed.      Results Review:       I reviewed the patient's new clinical results.  Results from last 7 days   Lab Units 10/05/19  1821   WBC 10*3/mm3 4.41   HEMOGLOBIN g/dL 13.4   PLATELETS 10*3/mm3 209     Results from last 7 days   Lab Units 10/06/19  0319 10/05/19  1821   SODIUM mmol/L 138 137   POTASSIUM mmol/L 3.8 4.0   CHLORIDE mmol/L 104 102   CO2 mmol/L 18.2* 20.2*   BUN mg/dL 10 8   CREATININE mg/dL 1.19* 1.29*   GLUCOSE mg/dL 104* 108*   Estimated Creatinine Clearance: 67.8 mL/min (A) (by C-G formula  based on SCr of 1.19 mg/dL (H)).  Results from last 7 days   Lab Units 10/06/19  0319 10/05/19  1821   ALBUMIN g/dL 3.40* 4.20   BILIRUBIN mg/dL 1.1 0.7   ALK PHOS U/L 115 148*   AST (SGOT) U/L 422* 620*   ALT (SGPT) U/L 362* 431*     Results from last 7 days   Lab Units 10/06/19  0319 10/05/19  1821   CALCIUM mg/dL 7.5* 8.9   ALBUMIN g/dL 3.40* 4.20       No results found for: HGBA1C, POCGLU      ceftriaxone 1 g Intravenous Q24H   famotidine 20 mg Intravenous Q12H   sodium chloride 10 mL Intravenous Q12H       sodium chloride 125 mL/hr Last Rate: 125 mL/hr (10/06/19 0800)   Diet Full Liquid       Assessment/Plan     Active Hospital Problems    Diagnosis  POA   • **Hepatitis [K75.9]  Unknown   • LFT elevation [R94.5]  Unknown   • UTI (urinary tract infection), bacterial [N39.0, A49.9]  Unknown   • Hypotension [I95.9]  Unknown   • Tachycardia [R00.0]  Unknown   • Left upper quadrant pain [R10.12]  Yes      Resolved Hospital Problems   No resolved problems to display.   Acute Hepatitis  - hepatis panel is negative  - per patient history she has been taking more than the daily limit of tylenol-she last took this at about 2PM yesterday and about 4.5h later her acetaminophen level was 10.0mcg/mL, well below NAC treatment threshold  - continue supportive care, IVF  - of course no more tylenol for now  - GI following, appreciate recs    UTI  - continue on ceftriaxone  - pyridium for dysuria    SCDs for DVT prophylaxis.  Full code.  Discussed with patient and nursing staff.  Anticipate discharge home in 1-2 days.      Felipe Paez MD  [facility]  10/06/19  3:58 PM

## 2019-10-07 ENCOUNTER — HOSPITAL ENCOUNTER (OUTPATIENT)
Facility: HOSPITAL | Age: 49
Setting detail: HOSPITAL OUTPATIENT SURGERY
End: 2019-10-07
Attending: UROLOGY | Admitting: UROLOGY

## 2019-10-07 LAB
ALBUMIN SERPL-MCNC: 3.3 G/DL (ref 3.5–5.2)
ALBUMIN/GLOB SERPL: 1.6 G/DL
ALP SERPL-CCNC: 115 U/L (ref 39–117)
ALT SERPL W P-5'-P-CCNC: 307 U/L (ref 1–33)
ANION GAP SERPL CALCULATED.3IONS-SCNC: 9.2 MMOL/L (ref 5–15)
AST SERPL-CCNC: 256 U/L (ref 1–32)
BILIRUB SERPL-MCNC: 0.5 MG/DL (ref 0.2–1.2)
BUN BLD-MCNC: 7 MG/DL (ref 6–20)
BUN/CREAT SERPL: 6.9 (ref 7–25)
CALCIUM SPEC-SCNC: 7.8 MG/DL (ref 8.6–10.5)
CHLORIDE SERPL-SCNC: 108 MMOL/L (ref 98–107)
CO2 SERPL-SCNC: 23.8 MMOL/L (ref 22–29)
CREAT BLD-MCNC: 1.01 MG/DL (ref 0.57–1)
DEPRECATED RDW RBC AUTO: 43.6 FL (ref 37–54)
EOSINOPHIL # BLD MANUAL: 0.29 10*3/MM3 (ref 0–0.4)
EOSINOPHIL NFR BLD MANUAL: 5.2 % (ref 0.3–6.2)
ERYTHROCYTE [DISTWIDTH] IN BLOOD BY AUTOMATED COUNT: 13.9 % (ref 12.3–15.4)
GFR SERPL CREATININE-BSD FRML MDRD: 59 ML/MIN/1.73
GIANT PLATELETS: ABNORMAL
GLOBULIN UR ELPH-MCNC: 2.1 GM/DL
GLUCOSE BLD-MCNC: 89 MG/DL (ref 65–99)
HCT VFR BLD AUTO: 30.9 % (ref 34–46.6)
HGB BLD-MCNC: 9.9 G/DL (ref 12–15.9)
INR PPP: 0.98 (ref 0.9–1.1)
LYMPHOCYTES # BLD MANUAL: 1.79 10*3/MM3 (ref 0.7–3.1)
LYMPHOCYTES NFR BLD MANUAL: 3.1 % (ref 5–12)
LYMPHOCYTES NFR BLD MANUAL: 32 % (ref 19.6–45.3)
MCH RBC QN AUTO: 27.3 PG (ref 26.6–33)
MCHC RBC AUTO-ENTMCNC: 32 G/DL (ref 31.5–35.7)
MCV RBC AUTO: 85.4 FL (ref 79–97)
MONOCYTES # BLD AUTO: 0.17 10*3/MM3 (ref 0.1–0.9)
NEUTROPHILS # BLD AUTO: 3.28 10*3/MM3 (ref 1.7–7)
NEUTROPHILS NFR BLD MANUAL: 58.8 % (ref 42.7–76)
PLATELET # BLD AUTO: 166 10*3/MM3 (ref 140–450)
PMV BLD AUTO: 10.1 FL (ref 6–12)
POTASSIUM BLD-SCNC: 4.3 MMOL/L (ref 3.5–5.2)
PROT SERPL-MCNC: 5.4 G/DL (ref 6–8.5)
PROTHROMBIN TIME: 12.7 SECONDS (ref 11.7–14.2)
RBC # BLD AUTO: 3.62 10*6/MM3 (ref 3.77–5.28)
RBC MORPH BLD: NORMAL
SODIUM BLD-SCNC: 141 MMOL/L (ref 136–145)
VARIANT LYMPHS NFR BLD MANUAL: 1 % (ref 0–5)
WBC MORPH BLD: NORMAL
WBC NRBC COR # BLD: 5.58 10*3/MM3 (ref 3.4–10.8)

## 2019-10-07 PROCEDURE — 99232 SBSQ HOSP IP/OBS MODERATE 35: CPT | Performed by: INTERNAL MEDICINE

## 2019-10-07 PROCEDURE — 25010000002 MORPHINE PER 10 MG: Performed by: HOSPITALIST

## 2019-10-07 PROCEDURE — 85610 PROTHROMBIN TIME: CPT | Performed by: INTERNAL MEDICINE

## 2019-10-07 PROCEDURE — 80053 COMPREHEN METABOLIC PANEL: CPT | Performed by: INTERNAL MEDICINE

## 2019-10-07 PROCEDURE — 25010000002 ONDANSETRON PER 1 MG: Performed by: HOSPITALIST

## 2019-10-07 PROCEDURE — 85007 BL SMEAR W/DIFF WBC COUNT: CPT | Performed by: INTERNAL MEDICINE

## 2019-10-07 PROCEDURE — 25010000002 CEFTRIAXONE PER 250 MG: Performed by: HOSPITALIST

## 2019-10-07 PROCEDURE — 63710000001 DIPHENHYDRAMINE PER 50 MG: Performed by: HOSPITALIST

## 2019-10-07 PROCEDURE — 85025 COMPLETE CBC W/AUTO DIFF WBC: CPT | Performed by: INTERNAL MEDICINE

## 2019-10-07 RX ORDER — PHENAZOPYRIDINE HYDROCHLORIDE 200 MG/1
200 TABLET, FILM COATED ORAL 3 TIMES DAILY PRN
Status: DISCONTINUED | OUTPATIENT
Start: 2019-10-07 | End: 2019-10-10 | Stop reason: HOSPADM

## 2019-10-07 RX ORDER — OXYCODONE HYDROCHLORIDE 5 MG/1
5 TABLET ORAL EVERY 4 HOURS PRN
Status: DISCONTINUED | OUTPATIENT
Start: 2019-10-07 | End: 2019-10-10 | Stop reason: HOSPADM

## 2019-10-07 RX ADMIN — MORPHINE SULFATE 2 MG: 2 INJECTION, SOLUTION INTRAMUSCULAR; INTRAVENOUS at 00:10

## 2019-10-07 RX ADMIN — PHENAZOPYRIDINE 200 MG: 200 TABLET ORAL at 08:33

## 2019-10-07 RX ADMIN — SODIUM CHLORIDE, PRESERVATIVE FREE 10 ML: 5 INJECTION INTRAVENOUS at 08:33

## 2019-10-07 RX ADMIN — ONDANSETRON 4 MG: 2 INJECTION INTRAMUSCULAR; INTRAVENOUS at 10:17

## 2019-10-07 RX ADMIN — FAMOTIDINE 20 MG: 10 INJECTION INTRAVENOUS at 20:02

## 2019-10-07 RX ADMIN — MORPHINE SULFATE 2 MG: 2 INJECTION, SOLUTION INTRAMUSCULAR; INTRAVENOUS at 15:10

## 2019-10-07 RX ADMIN — SODIUM CHLORIDE 125 ML/HR: 9 INJECTION, SOLUTION INTRAVENOUS at 18:08

## 2019-10-07 RX ADMIN — MORPHINE SULFATE 2 MG: 2 INJECTION, SOLUTION INTRAMUSCULAR; INTRAVENOUS at 08:33

## 2019-10-07 RX ADMIN — MORPHINE SULFATE 2 MG: 2 INJECTION, SOLUTION INTRAMUSCULAR; INTRAVENOUS at 20:02

## 2019-10-07 RX ADMIN — OXYCODONE HYDROCHLORIDE 5 MG: 5 TABLET ORAL at 18:11

## 2019-10-07 RX ADMIN — SODIUM CHLORIDE, PRESERVATIVE FREE 10 ML: 5 INJECTION INTRAVENOUS at 20:02

## 2019-10-07 RX ADMIN — DIPHENHYDRAMINE HYDROCHLORIDE 50 MG: 25 CAPSULE ORAL at 22:00

## 2019-10-07 RX ADMIN — CEFTRIAXONE SODIUM 1 G: 1 INJECTION, SOLUTION INTRAVENOUS at 00:31

## 2019-10-07 RX ADMIN — FAMOTIDINE 20 MG: 10 INJECTION INTRAVENOUS at 08:33

## 2019-10-07 RX ADMIN — OXYCODONE HYDROCHLORIDE 5 MG: 5 TABLET ORAL at 23:51

## 2019-10-07 RX ADMIN — OXYCODONE HYDROCHLORIDE 5 MG: 5 TABLET ORAL at 12:16

## 2019-10-07 RX ADMIN — SODIUM CHLORIDE 125 ML/HR: 9 INJECTION, SOLUTION INTRAVENOUS at 08:27

## 2019-10-07 RX ADMIN — SODIUM CHLORIDE 125 ML/HR: 9 INJECTION, SOLUTION INTRAVENOUS at 00:09

## 2019-10-07 NOTE — PROGRESS NOTES
Clinical Pharmacy Services: Medication History    Val Pettit is a 49 y.o. female presenting to [facility] for Hepatitis [K75.9]  Renal colic on right side [N23]  Accidental acetaminophen overdose, initial encounter [T39.1X1A]    She  has a past medical history of Arthritis, GERD (gastroesophageal reflux disease), History of transfusion, Hypotension, IBS (irritable bowel syndrome), and PONV (postoperative nausea and vomiting).    Allergies as of 10/05/2019 - Reviewed 10/05/2019   Allergen Reaction Noted   • Erythromycin Diarrhea, Nausea And Vomiting, and GI Intolerance 09/24/2012   • Lactose intolerance (gi) Diarrhea 10/05/2019   • Meperidine Other (See Comments) 09/24/2012   • Orange juice Diarrhea 10/05/2019       Medication information was obtained from: Patient    Prior to Admission Medications     Prescriptions Last Dose Informant Patient Reported? Taking?    Acetaminophen (ACETAMIN PO)   Yes Yes    Take 650 mg by mouth Every 6 (Six) Hours As Needed (for headaches and  kidney stone pain).    diphenhydrAMINE (BENADRYL) 50 MG capsule   Yes Yes    Take 50 mg by mouth At Night As Needed for Allergies or Sleep.    phenazopyridine (PYRIDIUM) 200 MG tablet   Yes Yes    Take 200 mg by mouth 3 (Three) Times a Day. For bladder spasms x 5 days (started 10/1/19)    sulfamethoxazole-trimethoprim (BACTRIM DS,SEPTRA DS) 800-160 MG per tablet  Self Yes Yes    Take 1 tablet by mouth 2 (Two) Times a Day. For UTI x 10 days (started 10/1/19)        This medication list is complete to the best of my knowledge as of 10/7/2019    Please call if questions.    Trudy Frankel [facility]  Clinical Staff Pharmacist    10/7/2019 12:15 PM

## 2019-10-07 NOTE — CONSULTS
FIRST UROLOGY CONSULT      Patient Identification:  NAME:  Val Pettit  Age:  49 y.o.   Sex:  female   :  1970   MRN:  [id number]       Chief complaint: Right flank pain    History of present illness:  This is a 49 year old female who reports a 3-week history of intermittent right flank pain. Sharp, severe. Associated with urgency and frequency. She presented to urgent care, where microscopic hematuria was found. She was treated for a presumed UTI. Culture was ultimately negative. She developed nausea, vomiting with no improvement in the pain. She presented to the ER for further management. CT abd/pelvis demonstrates a cluster of stones in the right distal ureter, largest 4mm. Minimal hydronephrosis.      Past medical history:  Past Medical History:   Diagnosis Date   • Arthritis    • GERD (gastroesophageal reflux disease)    • History of transfusion    • Hypotension    • IBS (irritable bowel syndrome)    • PONV (postoperative nausea and vomiting)        Past surgical history:  Past Surgical History:   Procedure Laterality Date   • ANKLE SURGERY Right    • BACK SURGERY      herniated disc removal   •  SECTION     • CHOLECYSTECTOMY     • COLONOSCOPY     • ENDOSCOPY     • JOINT REPLACEMENT     • KNEE SURGERY Bilateral     x3 each   • SHOULDER SURGERY Left     x2   • SKIN BIOPSY         Allergies:  Egg white [albumen, egg]; Erythromycin; Lactose intolerance (gi); Meperidine; and [place] juice    Home medications:  Medications Prior to Admission   Medication Sig Dispense Refill Last Dose   • Acetaminophen (ACETAMIN PO) Take 650 mg by mouth Every 6 (Six) Hours As Needed (for headaches and  kidney stone pain).      • diphenhydrAMINE (BENADRYL) 50 MG capsule Take 50 mg by mouth At Night As Needed for Allergies or Sleep.      • phenazopyridine (PYRIDIUM) 200 MG tablet Take 200 mg by mouth 3 (Three) Times a Day. For bladder spasms x 5 days (started 10/1/19)      • sulfamethoxazole-trimethoprim  (BACTRIM DS,SEPTRA DS) 800-160 MG per tablet Take 1 tablet by mouth 2 (Two) Times a Day. For UTI x 10 days (started 10/1/19)           Hospital medications:    ceftriaxone 1 g Intravenous Q24H   famotidine 20 mg Intravenous Q12H   sodium chloride 10 mL Intravenous Q12H       sodium chloride 125 mL/hr Last Rate: 125 mL/hr (10/07/19 0827)     diphenhydrAMINE  •  Morphine  •  ondansetron **OR** ondansetron  •  oxyCODONE  •  phenazopyridine  •  [COMPLETED] Insert peripheral IV **AND** sodium chloride  •  sodium chloride    Family history:  History reviewed. No pertinent family history.    Social history:  Social History     Tobacco Use   • Smoking status: Never Smoker   • Smokeless tobacco: Never Used   Substance Use Topics   • Alcohol use: No     Frequency: Never   • Drug use: No       Review of systems:      Positive for:  As per HPI  Negative for:  Fevers, chills, blurry vision, headaches, sore throat, hearing loss, enlarged cervical lymph nodes, chest pain, shortness of breath, palpitations, wheezing, diarrhea, constipation, hematochezia, depressed mood, anxiety, rash, joint pain, weakness, numbness.    Objective:  TMax 24 hours:   Temp (24hrs), Av.9 °F (36.6 °C), Min:97.8 °F (36.6 °C), Max:98.1 °F (36.7 °C)      Vitals Ranges:   Temp:  [97.8 °F (36.6 °C)-98.1 °F (36.7 °C)] 98.1 °F (36.7 °C)  Heart Rate:  [79-91] 86  Resp:  [16-18] 18  BP: ()/(51-69) 112/67    Intake/Output Last 3 shifts:  I/O last 3 completed shifts:  In: 4011.7 [P.O.:120; I.V.:2791.7; IV Piggyback:1100]  Out: 1000 [Urine:1000]     Physical Exam:    General Appearance:    Alert, cooperative, NAD   HEENT:    No trauma, pupils reactive, hearing intact   Back:     No CVA tenderness   Lungs:     Respirations unlabored, no wheezing    Heart:    RRR, intact peripheral pulses   Abdomen:     Soft, NDNT, no masses, no guarding   :    Pelvic not performed, bladder non distended and non tender   Extremities:   No edema, no deformity   Lymphatic:    No neck or groin LAD   Skin:   No bleeding, bruising or rashes   Neuro/Psych:   Orientation intact, mood/affect pleasant, no focal findings       Results review:   I reviewed the patient's new clinical results.    Data review:  Lab Results (last 24 hours)     Procedure Component Value Units Date/Time    CBC & Differential [[id number]] Collected:  10/07/19 0309    Specimen:  Blood Updated:  10/07/19 0425    Narrative:       The following orders were created for panel order CBC & Differential.  Procedure                               Abnormality         Status                     ---------                               -----------         ------                     CBC Auto Differential[[id number]]        Abnormal            Final result                 Please view results for these tests on the individual orders.    CBC Auto Differential [[id number]]  (Abnormal) Collected:  10/07/19 0309    Specimen:  Blood Updated:  10/07/19 0425     WBC 5.58 10*3/mm3      RBC 3.62 10*6/mm3      Hemoglobin 9.9 g/dL      Hematocrit 30.9 %      MCV 85.4 fL      MCH 27.3 pg      MCHC 32.0 g/dL      RDW 13.9 %      RDW-SD 43.6 fl      MPV 10.1 fL      Platelets 166 10*3/mm3     Manual Differential [[id number]]  (Abnormal) Collected:  10/07/19 0309    Specimen:  Blood Updated:  10/07/19 0425     Neutrophil % 58.8 %      Lymphocyte % 32.0 %      Monocyte % 3.1 %      Eosinophil % 5.2 %      Atypical Lymphocyte % 1.0 %      Neutrophils Absolute 3.28 10*3/mm3      Lymphocytes Absolute 1.79 10*3/mm3      Monocytes Absolute 0.17 10*3/mm3      Eosinophils Absolute 0.29 10*3/mm3      RBC Morphology Normal     WBC Morphology Normal     Giant Platelets Large/3+    Comprehensive Metabolic Panel [[id number]]  (Abnormal) Collected:  10/07/19 0309    Specimen:  Blood Updated:  10/07/19 0424     Glucose 89 mg/dL      BUN 7 mg/dL      Creatinine 1.01 mg/dL      Sodium 141 mmol/L      Potassium 4.3 mmol/L      Chloride 108 mmol/L      CO2 23.8 mmol/L       Calcium 7.8 mg/dL      Total Protein 5.4 g/dL      Albumin 3.30 g/dL      ALT (SGPT) 307 U/L      AST (SGOT) 256 U/L      Alkaline Phosphatase 115 U/L      Total Bilirubin 0.5 mg/dL      eGFR Non African Amer 59 mL/min/1.73      Globulin 2.1 gm/dL      A/G Ratio 1.6 g/dL      BUN/Creatinine Ratio 6.9     Anion Gap 9.2 mmol/L     Narrative:       GFR Normal >60  Chronic Kidney Disease <60  Kidney Failure <15    Protime-INR [[id number]]  (Normal) Collected:  10/07/19 0309    Specimen:  Blood Updated:  10/07/19 0408     Protime 12.7 Seconds      INR 0.98    CBC (No Diff) [[id number]]  (Abnormal) Collected:  10/06/19 1716    Specimen:  Blood from Arm, Left Updated:  10/06/19 1734     WBC 5.53 10*3/mm3      RBC 4.14 10*6/mm3      Hemoglobin 11.2 g/dL      Hematocrit 34.9 %      MCV 84.3 fL      MCH 27.1 pg      MCHC 32.1 g/dL      RDW 14.0 %      RDW-SD 43.5 fl      MPV 9.5 fL      Platelets 168 10*3/mm3     Comprehensive Metabolic Panel [[id number]]  (Abnormal) Collected:  10/06/19 1606    Specimen:  Blood from Arm, Left Updated:  10/06/19 1646     Glucose 90 mg/dL      BUN 8 mg/dL      Creatinine 0.97 mg/dL      Sodium 140 mmol/L      Potassium 4.1 mmol/L      Chloride 108 mmol/L      CO2 22.9 mmol/L      Calcium 7.6 mg/dL      Total Protein 5.7 g/dL      Albumin 3.50 g/dL      ALT (SGPT) 356 U/L      AST (SGOT) 397 U/L      Alkaline Phosphatase 121 U/L      Total Bilirubin 0.8 mg/dL      eGFR Non African Amer 61 mL/min/1.73      Globulin 2.2 gm/dL      A/G Ratio 1.6 g/dL      BUN/Creatinine Ratio 8.2     Anion Gap 9.1 mmol/L     Narrative:       GFR Normal >60  Chronic Kidney Disease <60  Kidney Failure <15    Protime-INR [[id number]]  (Normal) Collected:  10/06/19 1607    Specimen:  Blood from Arm, Left Updated:  10/06/19 1627     Protime 13.0 Seconds      INR 1.01           Imaging:  Imaging Results (last 24 hours)     ** No results found for the last 24 hours. **             Assessment:       Hepatitis    Left upper  quadrant pain    LFT elevation    UTI (urinary tract infection), bacterial    Hypotension    Tachycardia    Right ureteral stone    Plan:     - discussed options including trial of passage, ureteroscopy and laser lithotripsy, ESWL. After weighing the risks, she would like to proceed with ureteroscopy. We will schedule the procedure, likely on Wednesday. She is safe for discharge on pain/nausea meds and antibiotics in the interim. Strain all urine.    Satish Sibley Jr., MD  10/07/19  1:16 PM

## 2019-10-07 NOTE — PROGRESS NOTES
Discharge Planning Assessment  [facility]     Patient Name: Val Pettit  MRN: [id number]  Today's Date: 10/7/2019    Admit Date: 10/5/2019    Discharge Needs Assessment     Row Name 10/07/19 1517       Living Environment    Lives With  child(nany), dependent;spouse    Name(s) of Who Lives With Patient  Aidan () 1 minor child    Current Living Arrangements  home/apartment/condo    Primary Care Provided by  self    Provides Primary Care For  child(nany)    Quality of Family Relationships  involved;supportive    Able to Return to Prior Arrangements  yes       Resource/Environmental Concerns    Resource/Environmental Concerns  none    Transportation Concerns  car, none family only has 1 car and spouse works.       Transition Planning    Patient/Family Anticipates Transition to  home with family    Patient/Family Anticipated Services at Transition  none    Transportation Anticipated  family or friend will provide       Discharge Needs Assessment    Readmission Within the Last 30 Days  no previous admission in last 30 days    Concerns to be Addressed  denies needs/concerns at this time    Equipment Currently Used at Home  walker, standard from a previous sx    Anticipated Changes Related to Illness  none    Equipment Needed After Discharge  none    Offered/Gave Vendor List  other (see comments) previously used [employer] and would use them if needed        Discharge Plan     Row Name 10/07/19 9798       Plan    Plan  home with family    Patient/Family in Agreement with Plan  yes    Plan Comments  Facesheet verified.  Family will give ride at D/C. Patient denies needs or concerns.  CCP will follow.                  Demographic Summary     Row Name 10/07/19 1519       General Information    Admission Type  inpatient    Arrived From  home    Reason for Consult  discharge planning    Preferred Language  English     Used During This Interaction  no       Contact Information    Permission Granted to Share  Info With  family/designee    Contact Information Comments  Aidan Pettit  [phone]        Functional Status     Row Name 10/07/19 1512       Functional Status    Usual Activity Tolerance  good    Current Activity Tolerance  good       Functional Status, IADL    Medications  independent    Meal Preparation  independent    Housekeeping  independent    Laundry  independent    Shopping  independent       Mental Status    General Appearance WDL  WDL       Mental Status Summary    Recent Changes in Mental Status/Cognitive Functioning  ability to understand       Employment/    Employment Status  unemployed            Abuse/Neglect     Row Name 10/07/19 1514       Personal Safety    Feels Unsafe at Home or Work/School  no    Feels Threatened by Someone  no    Does Anyone Try to Keep You From Having Contact with Others or Doing Things Outside Your Home?  no    Physical Signs of Abuse Present  no                Shannon Epley, RN

## 2019-10-07 NOTE — PROGRESS NOTES
Name: Val Pettit ADMIT: 10/5/2019   : 1970  PCP: Xu Kyle MD    MRN: [id number] LOS: 2 days   AGE/SEX: 49 y.o. female  ROOM: [facility]     Subjective   Subjective   CC: abdominal/flank pain  No acute events. Right flank pain continues-she is concerned because this has been going on for 3 weeks.  Taking PO. No CP/dyspnea/f/c. +nausea, no vomiting or diarrhea.    Objective   Objective   Vital Signs  Temp:  [97.8 °F (36.6 °C)-98.1 °F (36.7 °C)] 98.1 °F (36.7 °C)  Heart Rate:  [79-91] 86  Resp:  [16-18] 18  BP: ()/(51-69) 112/67  SpO2:  [92 %-94 %] 94 %  on   ;   Device (Oxygen Therapy): room air  Body mass index is 38.06 kg/m².  Physical Exam   Constitutional: She is oriented to person, place, and time. No distress.   HENT:   Head: Normocephalic and atraumatic.   Mouth/Throat: Oropharynx is clear and moist.   Eyes: Conjunctivae and EOM are normal. Pupils are equal, round, and reactive to light.   Neck: Normal range of motion. Neck supple.   Cardiovascular: Normal rate, regular rhythm and intact distal pulses.   Pulmonary/Chest: Effort normal and breath sounds normal.   Abdominal: Soft. Bowel sounds are normal. There is no tenderness.   Musculoskeletal: She exhibits no edema or tenderness.   Neurological: She is alert and oriented to person, place, and time.   Skin: Skin is warm and dry. She is not diaphoretic.   Psychiatric: She has a normal mood and affect. Her behavior is normal.   Nursing note and vitals reviewed.      Results Review:       I reviewed the patient's new clinical results.  Results from last 7 days   Lab Units 10/07/19  0309 10/06/19  1716 10/05/19  1821   WBC 10*3/mm3 5.58 5.53 4.41   HEMOGLOBIN g/dL 9.9* 11.2* 13.4   PLATELETS 10*3/mm3 166 168 209     Results from last 7 days   Lab Units 10/07/19  0309 10/06/19  1606 10/06/19  0319 10/05/19  1821   SODIUM mmol/L 141 140 138 137   POTASSIUM mmol/L 4.3 4.1 3.8 4.0   CHLORIDE mmol/L 108* 108* 104 102   CO2 mmol/L 23.8 22.9  18.2* 20.2*   BUN mg/dL 7 8 10 8   CREATININE mg/dL 1.01* 0.97 1.19* 1.29*   GLUCOSE mg/dL 89 90 104* 108*   Estimated Creatinine Clearance: 79.9 mL/min (A) (by C-G formula based on SCr of 1.01 mg/dL (H)).  Results from last 7 days   Lab Units 10/07/19  0309 10/06/19  1606 10/06/19  0319 10/05/19  1821   ALBUMIN g/dL 3.30* 3.50 3.40* 4.20   BILIRUBIN mg/dL 0.5 0.8 1.1 0.7   ALK PHOS U/L 115 121* 115 148*   AST (SGOT) U/L 256* 397* 422* 620*   ALT (SGPT) U/L 307* 356* 362* 431*     Results from last 7 days   Lab Units 10/07/19  0309 10/06/19  1606 10/06/19  0319 10/05/19  1821   CALCIUM mg/dL 7.8* 7.6* 7.5* 8.9   ALBUMIN g/dL 3.30* 3.50 3.40* 4.20       No results found for: HGBA1C, POCGLU      ceftriaxone 1 g Intravenous Q24H   famotidine 20 mg Intravenous Q12H   sodium chloride 10 mL Intravenous Q12H       sodium chloride 125 mL/hr Last Rate: 125 mL/hr (10/07/19 0827)   Diet Regular; Lactose Restricted       Assessment/Plan     Active Hospital Problems    Diagnosis  POA   • **Hepatitis [K75.9]  Unknown   • LFT elevation [R94.5]  Unknown   • UTI (urinary tract infection), bacterial [N39.0, A49.9]  Unknown   • Hypotension [I95.9]  Unknown   • Tachycardia [R00.0]  Unknown   • Left upper quadrant pain [R10.12]  Yes      Resolved Hospital Problems   No resolved problems to display.   Acute Hepatitis  - hepatis panel is negative  - improving-no indication for NAC  - continue supportive care, IVF  - of course no more tylenol for now  - GI following, appreciate recs    UTI  - continue on ceftriaxone  - pyridium for dysuria    Right flank Pain  - has what looks like some right ureterolithiasis  - she c/o ongoing pain for the past 3 weeks in this area-stones are small but given the duration of symptoms will ask urology to evaluate    SCDs for DVT prophylaxis.  Full code.  Discussed with patient and nursing staff.  Anticipate discharge home possibly later today depending on urology recs      Felipe Paez MD  [facility]  10/07/19  11:45 AM

## 2019-10-07 NOTE — PROGRESS NOTES
[facility]  Inpatient Progress Note    Reason for Follow Up:  Elevated liver enzymes    Subjective     Interval History:   She is without GI complaints.    Current Facility-Administered Medications:   •  cefTRIAXone (ROCEPHIN) IVPB 1 g, 1 g, Intravenous, Q24H, Anthony Corbin MD, Last Rate: 100 mL/hr at 10/07/19 0031, 1 g at 10/07/19 0031  •  diphenhydrAMINE (BENADRYL) capsule 50 mg, 50 mg, Oral, Nightly PRN, Anthony Corbin MD, 50 mg at 10/06/19 2126  •  famotidine (PEPCID) injection 20 mg, 20 mg, Intravenous, Q12H, Anthony Corbin MD, 20 mg at 10/07/19 0833  •  morphine injection 2 mg, 2 mg, Intravenous, Q4H PRN, Anthony Corbin MD, 2 mg at 10/07/19 0833  •  ondansetron (ZOFRAN) tablet 4 mg, 4 mg, Oral, Q6H PRN **OR** ondansetron (ZOFRAN) injection 4 mg, 4 mg, Intravenous, Q6H PRN, Anthony Corbin MD, 4 mg at 10/06/19 0744  •  phenazopyridine (PYRIDIUM) tablet 200 mg, 200 mg, Oral, TID, Felipe Paez MD, 200 mg at 10/07/19 0833  •  [COMPLETED] Insert peripheral IV, , , Once **AND** sodium chloride 0.9 % flush 10 mL, 10 mL, Intravenous, PRN, Rolando Lemus MD, 10 mL at 10/06/19 0237  •  sodium chloride 0.9 % flush 10 mL, 10 mL, Intravenous, Q12H, Anthoyn Corbin MD, 10 mL at 10/07/19 0833  •  sodium chloride 0.9 % flush 10 mL, 10 mL, Intravenous, PRN, Anthony Corbin MD  •  sodium chloride 0.9 % infusion, 125 mL/hr, Intravenous, Continuous, Anthony Corbin MD, Last Rate: 125 mL/hr at 10/07/19 0827, 125 mL/hr at 10/07/19 0827  Review of Systems:    The following systems were reviewed and negative;  constitution and gastrointestinal    Objective     Vital Signs  Temp:  [97.8 °F (36.6 °C)-98.1 °F (36.7 °C)] 98.1 °F (36.7 °C)  Heart Rate:  [79-91] 86  Resp:  [16-18] 18  BP: ()/(51-69) 112/67  Body mass index is 38.06 kg/m².    Intake/Output Summary (Last 24 hours) at 10/7/2019 0911  Last data filed at 10/7/2019 0827  Gross per 24 hour    Intake 2120 ml   Output 1000 ml   Net 1120 ml     I/O this shift:  In: 1000 [I.V.:1000]  Out: -      Physical Exam:   General: patient awake, alert and cooperative   Eyes: Normal lids and lashes, no scleral icterus   Neck: supple, normal ROM   Skin: warm and dry, not jaundiced   Cardiovascular: regular rhythm and rate, no murmurs auscultated   Pulm: clear to auscultation bilaterally, regular and unlabored   Abdomen: soft, nontender, nondistended; normal bowel sounds   Rectal: deferred   Extremities: no rash or edema   Psychiatric: Normal mood and behavior; memory intact     Results Review:     I reviewed the patient's new clinical results.    Results from last 7 days   Lab Units 10/07/19  0309 10/06/19  1716 10/05/19  1821   WBC 10*3/mm3 5.58 5.53 4.41   HEMOGLOBIN g/dL 9.9* 11.2* 13.4   HEMATOCRIT % 30.9* 34.9 41.9   PLATELETS 10*3/mm3 166 168 209     Results from last 7 days   Lab Units 10/07/19  0309 10/06/19  1606 10/06/19  0319   SODIUM mmol/L 141 140 138   POTASSIUM mmol/L 4.3 4.1 3.8   CHLORIDE mmol/L 108* 108* 104   CO2 mmol/L 23.8 22.9 18.2*   BUN mg/dL 7 8 10   CREATININE mg/dL 1.01* 0.97 1.19*   CALCIUM mg/dL 7.8* 7.6* 7.5*   BILIRUBIN mg/dL 0.5 0.8 1.1   ALK PHOS U/L 115 121* 115   ALT (SGPT) U/L 307* 356* 362*   AST (SGOT) U/L 256* 397* 422*   GLUCOSE mg/dL 89 90 104*     Results from last 7 days   Lab Units 10/07/19  0309 10/06/19  1607 10/05/19  2050   INR  0.98 1.01 0.99     Lab Results   Lab Value Date/Time    LIPASE 20 10/05/2019 1821         Assessment/Plan   Assessment:   1.   Elevated liver enzymes - hepatocellular pattern, improving  2.   Recent bactrim use  3.   Recent tylenol use  4.   Hepatic steatosis    Plan:   LFTs improving with continued normalization of INR.  There is concern about antecedent excess use of tylenol, but no indication for NAC at this point.  I also wonder if there could be an element of DILI from recent bactrim use, as she did have modest absolute eosinophilia on  admission.  Either way the treatment would appear to be supportive at this point given continued improvement in LFTs.  She will need close outpt monitoring of LFTs upon discharge.      I discussed the patients findings and my recommendations with patient.         Griffin Lorenz M.D.  [facility]  [address] [place] [address]  Office: [phone]

## 2019-10-07 NOTE — PLAN OF CARE
Problem: Patient Care Overview  Goal: Plan of Care Review  Outcome: Ongoing (interventions implemented as appropriate)   10/07/19 0235   Coping/Psychosocial   Plan of Care Reviewed With patient   OTHER   Outcome Summary no C/O N/V. pain on back.PRN morphine for pain.IV ABX for UTI. ALT, AST dropped slightly.     Goal: Individualization and Mutuality  Outcome: Ongoing (interventions implemented as appropriate)    Goal: Discharge Needs Assessment  Outcome: Ongoing (interventions implemented as appropriate)    Goal: Interprofessional Rounds/Family Conf  Outcome: Ongoing (interventions implemented as appropriate)      Problem: Pain, Chronic (Adult)  Goal: Identify Related Risk Factors and Signs and Symptoms  Outcome: Ongoing (interventions implemented as appropriate)    Goal: Acceptable Pain/Comfort Level and Functional Ability  Outcome: Ongoing (interventions implemented as appropriate)

## 2019-10-07 NOTE — NURSING NOTE
PT sleep on R.side. Her arm get numb and drops SPO2 as she turn to supine O2 will back over 90%. Pt has HX of back surgery and chronic back pain that radiate to other part of body, especially when she twist her back as she sleep.

## 2019-10-07 NOTE — PAYOR COMM NOTE
"UR CONTACT:  JANY  P: [phone]        F: [phone]    REF #[id number]    Val Pettit (49 y.o. Female)     Date of Birth Social Security Number Address Home Phone MRN    1970  [address] [facility] [address] [phone] [id number]    [employer] Marital Status          None        Admission Date Admission Type Admitting Provider Attending Provider Department, Room/Bed    10/5/19 Emergency Anthony Corbin MD Lykins, Matthew D, MD [address], [address]    Discharge Date Discharge Disposition Discharge Destination                       Attending Provider:  Felipe Paez MD    Allergies:  Egg White [Albumen, Egg], Erythromycin, Lactose Intolerance (Gi), Meperidine, [place] Juice    Isolation:  None   Infection:  None   Code Status:  CPR    Ht:  163.8 cm (64.5\")   Wt:  102 kg (225 lb 3.2 oz)    Admission Cmt:  None   Principal Problem:  Hepatitis [K75.9]                 Active Insurance as of 10/5/2019     Primary Coverage     Payor Plan Insurance Group Employer/Plan Group    [employer] BLUE [employer]OSS [employer] BLUE [employer]OSS BLUE [employer] PPO [id number]     Payor Plan Address Payor Plan Phone Number Payor Plan Fax Number Effective Dates    [address] [phone]  2018 - None Entered    [place] [address]       Subscriber Name Subscriber Birth Date Member ID       TOSHIA PETTIT 1964 [id number]                 Emergency Contacts      (Rel.) Home Phone Work Phone Mobile Phone    mark pettit (Spouse) -- -- [phone]               History & Physical      Anthony Corbin MD at 10/05/19 3948          HISTORY AND PHYSICAL   [facility]        Patient Identification:  Name: Val Pettit  Age: 49 y.o.  Sex: female  :  1970  MRN: [id number]                     Primary Care Physician: Xu Kyle MD    Chief Complaint: Abdominal/flank pain    History of Present Illness:   Ms Pettit is a 49-year-old female who " claims to have a past medical history of IBS that she normally avoids NSAIDs.  She states she has had dysuria and some flank pain and as a result she is been taking excessive amounts of Tylenol.  She states that she has been taking at least 500 mg every 4 hours.  At times she is even taking doses up to 1500 mg.  Patient was aware that this was definitely above suggested recommendations but continue to take them regardless.  As result she has had issues of nausea vomiting and abdominal discomfort.  She states that she also has dysuria and what she thought is been bloody urine and her urinalysis seems to be consistent with infection.  She went to [facility] couple days prior and was prescribed Pyridium as well as Bactrim.  She denies fever chills night sweats or altered mentation.  Initially she was started on acetylcysteine protocol due to concerns for liver failure as her LFTs were elevated in the ER to 431/620/148 for ALT AST and alkaline phosphatase respectively.  Her bilirubin is normal at 0.7 and a coagulation panel is unremarkable.  Her Tylenol level came back normal at 10 so no further acetylcysteine protocol was continued.    Past Medical History:  History reviewed. No pertinent past medical history.  Past Surgical History:  Past Surgical History:   Procedure Laterality Date   • ANKLE SURGERY Right    • BACK SURGERY     •  SECTION     • CHOLECYSTECTOMY     • KNEE SURGERY Bilateral     x3 each   • SHOULDER SURGERY Left     x2      Home Meds:  Medications Prior to Admission   Medication Sig Dispense Refill Last Dose   • Acetaminophen (ACETAMIN PO) Take 650 mg by mouth Every 6 (Six) Hours As Needed.      • diphenhydrAMINE (BENADRYL) 50 MG capsule Take 50 mg by mouth At Night As Needed.      • phenazopyridine (PYRIDIUM) 200 MG tablet Take 200 mg by mouth 3 (Three) Times a Day.      • sulfamethoxazole-trimethoprim (BACTRIM DS,SEPTRA DS) 800-160 MG per tablet Take 1 tablet by mouth 2 (Two) Times  "a Day.          Allergies:  Allergies   Allergen Reactions   • Erythromycin Diarrhea, Nausea And Vomiting and GI Intolerance   • Lactose Intolerance (Gi) Diarrhea   • Meperidine Other (See Comments)     Bradycardia & lightheadedness   • Orange Juice Diarrhea     Immunizations:    There is no immunization history on file for this patient.  Social History:   Social History     Social History Narrative   • Not on file     Social History     Socioeconomic History   • Marital status:      Spouse name: Not on file   • Number of children: Not on file   • Years of education: Not on file   • Highest education level: Not on file   Tobacco Use   • Smoking status: Never Smoker   • Smokeless tobacco: Never Used   Substance and Sexual Activity   • Alcohol use: No     Frequency: Never   • Drug use: No       Family History:  History reviewed. No pertinent family history.     Review of Systems  See history of present illness and past medical history.  Patient denies fever chills night sweats.  Admits to nausea vomiting and abdominal discomfort.  Denies any altered mentation changes to vision smell taste or sound chest pain palpitations cough shortness of breath.  Admits to dysuria and increased frequency and even think she is had some mild hematuria.  Denies any additional bruising bleeding.  Admits to global discomfort and agitation but denies any loss of consciousness or focal loss of function.  Remainder of ROS is negative.    Objective:  tMax 24 hrs: Temp (24hrs), Av.5 °F (36.9 °C), Min:97.7 °F (36.5 °C), Max:99.3 °F (37.4 °C)    Vitals Ranges:   Temp:  [97.7 °F (36.5 °C)-99.3 °F (37.4 °C)] 97.7 °F (36.5 °C)  Heart Rate:  [] 112  Resp:  [16-21] 21  BP: ()/() 92/60      Exam:  BP 92/60 (BP Location: Left arm, Patient Position: Lying)   Pulse 112   Temp 97.7 °F (36.5 °C) (Oral)   Resp 21   Ht 162.6 cm (64\")   Wt 102 kg (224 lb)   SpO2 96%   BMI 38.45 kg/m²      General Appearance:    Alert, " cooperative, nontoxic-appearing/no distress though dramatic, AOx3, no jaundice   Head:    Normocephalic, without obvious abnormality, atraumatic   Eyes:    PERRL, no scleral icterus, EOM's intact, both eyes   Ears:    Normal external ear canals, both ears   Nose:   Nares normal, septum midline, mucosa normal, no drainage    or sinus tenderness   Throat:   Lips, mucosa, and tongue normal though mucous membranes are dry   Neck:   Supple,no JVD       Lungs:     Clear to auscultation bilaterally, respirations unlabored        Heart:    Tachy rate and rhythm, S1 and S2 normal   Abdomen:     Soft, some generalized tenderness greatest around upper quadrants but no rebound guarding, bowel sounds active all four quadrants, morbidly obese with a BMI of 39, no masses, no hepatomegaly, no splenomegaly   Extremities:  Moving all, no cyanosis or edema   Pulses:   2+ and symmetric all extremities   Skin:  No jaundice       Neurologic:   CNII-XII intact, normal strength, no focal deficit      .    Data Review:  Labs in chart were reviewed.             Imaging Results (all)     Procedure Component Value Units Date/Time    CT Abdomen Pelvis Without Contrast [[id number]] Collected:  10/05/19 2022     Updated:  10/05/19 2303    Narrative:       NONCONTRAST CT SCANS ABDOMEN AND PELVIS     HISTORY: Flank pain hematuria     COMPARISON: None.     TECHNIQUE:Radiation dose reduction techniques were utilized, including  automated exposure control and exposure modulation based on body size.   Axial images were obtained from the lung bases to the symphysis pubis  without oral or IV contrast per request.      FINDINGS:  Diffuse fatty liver. There is a 14 mm low density liver  lesion inferiorly on image 56 which is too small for detailed assessment  without contrast. It does not appear to reflect a simple cyst. Follow-up  recommended. There is a 2.9 cm parapelvic left renal cyst. Best seen on  coronal image 102, is a linear hyperdensity in the  distal right ureter  likely a collection of tiny stones approximately 1.-1.5 cm proximal to  the UVJ. There is no significant right hydronephrosis or hydroureter..  Remaining solid organs are otherwise unremarkable without benefit of IV  contrast. Normal aorta and appendix. Mild diverticulosis.          Impression:       1.  Fatty liver with 14 mm low-density right hepatic lobe lesion.  Follow-up recommended.  2. 2.9 cm parapelvic left renal cyst.  3. Mild diverticulosis.  4. Probable collection of tiny stones in the distal right ureter as  discussed. No significant hydronephrosis              This report was finalized on 10/5/2019 11:00 PM by Felipe Jacobsen M.D.               Assessment:    Hepatitis    Left upper quadrant pain    LFT elevation    UTI (urinary tract infection), bacterial    Hypotension    Tachycardia      Plan:    Acute hepatitis likely influenced by obesity given steatosis noted on CT as well as exogenous/excessive use of Tylenol   -Acetylcysteine protocol initiated in ER though initial Tylenol level is unremarkable so we will hold this further overnight   -Trend LFTs -hepatitis panel negative   -Consult GI for additional recommendations   -Okay with morphine the first 24 to 48 hours for pain control    IVF bolus then maintenance for hypotension which should also help correct tachycardia though no signs of sepsis present, patient does seem to have a urinary tract infection as well as complaints of dysuria and increased frequency and will initiate Rocephin.    SCDs for DVT prophylaxis    IV Pepcid for GERD/GI prophylaxis    Patient seen and examined just prior to midnight on 10/5/2019 though this dictation fall shortly afterwards    Anthony Corbin MD  10/6/2019  12:18 AM    Electronically signed by Anthony Corbin MD at 10/06/19 0025          Emergency Department Notes      Layton Carmichael, RN at 10/05/19 1755        Right flank pain since Monday went to [facility] given antibiotics for UTI pt  looked at my chart states had blood in UA pt reports pain is worse. Pt denies hx of kidney stones.     Electronically signed by Layton Carmichael RN at 10/05/19 1756     Rosario Loja RN at 10/05/19 1809        Pt c/o right flank pain that started on Monday. Pt reports vaginal pressure and burning with urination and was seen at urgent care and placed on abx. ptstates that pain has been moving. Reports n/v and blood in urine     Rosario Loja RN  10/05/19 1811      Electronically signed by Rosario Loja RN at 10/05/19 1811     Dillon Villeda MD at 10/05/19 6233           EMERGENCY DEPARTMENT ENCOUNTER    Room Number:  07/07  Date of encounter:  10/5/2019  PCP: Xu Kyle MD  Historian: Patient      HPI:  Chief Complaint: Right flank pain and dysuria  A complete HPI/ROS/PMH/PSH/SH/FH are unobtainable due to: Nothing    Context: Val Pettit is a 49 y.o. female who presents to the ED c/o right flank pain and dysuria for several days now.  The pain is intermittent, nonradiating, and severe at its worse.  She said no fever, but feels like she has had blood in her urine.    Patient was seen in immediate care center a few days ago, and received antibiotics and Pyridium, which helped initially with the symptoms but is now returned.  Patient also states she is been taking large doses of acetaminophen over-the-counter for the pain.  She is not sure exactly how much she is been taking, but it sounds like every 4 hours she is been taking as many as 3 Tylenol extra strength, which could be up to 10 g/day for the last 4 days.      PAST MEDICAL HISTORY  Active Ambulatory Problems     Diagnosis Date Noted   • No Active Ambulatory Problems     Resolved Ambulatory Problems     Diagnosis Date Noted   • No Resolved Ambulatory Problems     No Additional Past Medical History         PAST SURGICAL HISTORY  Past Surgical History:   Procedure Laterality Date   • ANKLE SURGERY Right    • BACK SURGERY     •   SECTION     • CHOLECYSTECTOMY     • KNEE SURGERY Bilateral     x3 each   • SHOULDER SURGERY Left     x2         FAMILY HISTORY  History reviewed. No pertinent family history.      SOCIAL HISTORY  Social History     Socioeconomic History   • Marital status:      Spouse name: Not on file   • Number of children: Not on file   • Years of education: Not on file   • Highest education level: Not on file   Tobacco Use   • Smoking status: Never Smoker   • Smokeless tobacco: Never Used   Substance and Sexual Activity   • Alcohol use: No     Frequency: Never   • Drug use: No         ALLERGIES  Erythromycin and Meperidine        REVIEW OF SYSTEMS  Review of Systems     All systems reviewed and negative except for those discussed in HPI.       PHYSICAL EXAM    I have reviewed the triage vital signs and nursing notes.    ED Triage Vitals   Temp Heart Rate Resp BP SpO2   10/05/19 1756 10/05/19 1756 10/05/19 1756 10/05/19 1812 10/05/19 1756   99.3 °F (37.4 °C) (!) 121 18 138/83 93 %      Temp src Heart Rate Source Patient Position BP Location FiO2 (%)   10/05/19 1756 -- -- -- --   Tympanic           Physical Exam  GENERAL: not distressed  HENT: nares patent  EYES: no scleral icterus  CV: regular rhythm, regular rate  RESPIRATORY: normal effort  ABDOMEN: soft, mild right upper quadrant tenderness and hepatomegaly but no rebound or guarding  MUSCULOSKELETAL: no deformity  NEURO: alert, moves all extremities, follows commands  SKIN: warm, dry        LAB RESULTS  Recent Results (from the past 24 hour(s))   Comprehensive Metabolic Panel    Collection Time: 10/05/19  6:21 PM   Result Value Ref Range    Glucose 108 (H) 65 - 99 mg/dL    BUN 8 6 - 20 mg/dL    Creatinine 1.29 (H) 0.57 - 1.00 mg/dL    Sodium 137 136 - 145 mmol/L    Potassium 4.0 3.5 - 5.2 mmol/L    Chloride 102 98 - 107 mmol/L    CO2 20.2 (L) 22.0 - 29.0 mmol/L    Calcium 8.9 8.6 - 10.5 mg/dL    Total Protein 7.2 6.0 - 8.5 g/dL    Albumin 4.20 3.50 - 5.20 g/dL     ALT (SGPT) 431 (H) 1 - 33 U/L    AST (SGOT) 620 (H) 1 - 32 U/L    Alkaline Phosphatase 148 (H) 39 - 117 U/L    Total Bilirubin 0.7 0.2 - 1.2 mg/dL    eGFR Non African Amer 44 (L) >60 mL/min/1.73    Globulin 3.0 gm/dL    A/G Ratio 1.4 g/dL    BUN/Creatinine Ratio 6.2 (L) 7.0 - 25.0    Anion Gap 14.8 5.0 - 15.0 mmol/L   CBC Auto Differential    Collection Time: 10/05/19  6:21 PM   Result Value Ref Range    WBC 4.41 3.40 - 10.80 10*3/mm3    RBC 5.04 3.77 - 5.28 10*6/mm3    Hemoglobin 13.4 12.0 - 15.9 g/dL    Hematocrit 41.9 34.0 - 46.6 %    MCV 83.1 79.0 - 97.0 fL    MCH 26.6 26.6 - 33.0 pg    MCHC 32.0 31.5 - 35.7 g/dL    RDW 14.0 12.3 - 15.4 %    RDW-SD 42.1 37.0 - 54.0 fl    MPV 9.4 6.0 - 12.0 fL    Platelets 209 140 - 450 10*3/mm3    Neutrophil % 61.4 42.7 - 76.0 %    Lymphocyte % 19.3 (L) 19.6 - 45.3 %    Monocyte % 8.6 5.0 - 12.0 %    Eosinophil % 9.8 (H) 0.3 - 6.2 %    Basophil % 0.2 0.0 - 1.5 %    Immature Grans % 0.7 (H) 0.0 - 0.5 %    Neutrophils, Absolute 2.71 1.70 - 7.00 10*3/mm3    Lymphocytes, Absolute 0.85 0.70 - 3.10 10*3/mm3    Monocytes, Absolute 0.38 0.10 - 0.90 10*3/mm3    Eosinophils, Absolute 0.43 (H) 0.00 - 0.40 10*3/mm3    Basophils, Absolute 0.01 0.00 - 0.20 10*3/mm3    Immature Grans, Absolute 0.03 0.00 - 0.05 10*3/mm3    nRBC 0.0 0.0 - 0.2 /100 WBC   Lipase    Collection Time: 10/05/19  6:21 PM   Result Value Ref Range    Lipase 20 13 - 60 U/L   Acetaminophen Level    Collection Time: 10/05/19  6:21 PM   Result Value Ref Range    Acetaminophen 10.0 10.0 - 30.0 mcg/mL   Urinalysis With Microscopic If Indicated (No Culture) - Urine, Clean Catch    Collection Time: 10/05/19  6:22 PM   Result Value Ref Range    Color, UA Red (A) Yellow, Straw    Appearance, UA Turbid (A) Clear    pH, UA 6.5 5.0 - 8.0    Specific Gravity, UA 1.020 1.005 - 1.030    Glucose, UA >=1000 mg/dL (3+) (A) Negative    Ketones, UA 15 mg/dL (1+) (A) Negative    Bilirubin, UA Negative Negative    Blood, UA Moderate (2+)  (A) Negative    Protein, UA >=300 mg/dL (3+) (A) Negative    Leuk Esterase, UA Moderate (2+) (A) Negative    Nitrite, UA Positive (A) Negative    Urobilinogen, UA >=8.0 E.U./dL (A) 0.2 - 1.0 E.U./dL   Urinalysis, Microscopic Only - Urine, Clean Catch    Collection Time: 10/05/19  6:22 PM   Result Value Ref Range    RBC, UA 6-12 (A) None Seen, 0-2 /HPF    WBC, UA 3-5 (A) None Seen, 0-2 /HPF    Bacteria, UA Trace (A) None Seen /HPF    Squamous Epithelial Cells, UA None Seen None Seen, 0-2 /HPF    Hyaline Casts, UA None Seen None Seen /LPF    Amorphous Crystals, UA Large/3+ None Seen /HPF    Methodology Manual Light Microscopy    Hepatitis Panel, Acute    Collection Time: 10/05/19  8:50 PM   Result Value Ref Range    Hepatitis B Surface Ag Non-Reactive Non-Reactive    Hep A IgM Non-Reactive Non-Reactive    Hep B C IgM Non-Reactive Non-Reactive    Hepatitis C Ab Non-Reactive Non-Reactive   Protime-INR    Collection Time: 10/05/19  8:50 PM   Result Value Ref Range    Protime 12.8 11.7 - 14.2 Seconds    INR 0.99 0.90 - 1.10       Ordered the above labs and independently reviewed the results.        RADIOLOGY  Ct Abdomen Pelvis Without Contrast    Result Date: 10/5/2019  NONCONTRAST CT SCANS ABDOMEN AND PELVIS  HISTORY: Flank pain hematuria  COMPARISON: None.  TECHNIQUE:Radiation dose reduction techniques were utilized, including automated exposure control and exposure modulation based on body size. Axial images were obtained from the lung bases to the symphysis pubis without oral or IV contrast per request.  FINDINGS:  Diffuse fatty liver. There is a 14 mm low density liver lesion inferiorly on image 56 which is too small for detailed assessment without contrast. It does not appear to reflect a simple cyst. Follow-up recommended. There is a 2.9 cm parapelvic left renal cyst. Best seen on coronal image 102, is a linear hyperdensity in the distal right ureter likely a collection of tiny stones approximately 1.-1.5 cm  proximal to the UVJ. There is no significant right hydronephrosis or hydroureter.. Remaining solid organs are otherwise unremarkable without benefit of IV contrast. Normal aorta and appendix. Mild diverticulosis.       1.  Fatty liver with 14 mm low-density right hepatic lobe lesion. Follow-up recommended. 2. 2.9 cm parapelvic left renal cyst. 3. Mild diverticulosis. 4. Probable collection of tiny stones in the distal right ureter as discussed. No significant hydronephrosis             I ordered the above noted radiological studies. Reviewed by me and discussed with radiologist.  See dictation for official radiology interpretation.      PROCEDURES    Procedures      MEDICATIONS GIVEN IN ER    Medications   sodium chloride 0.9 % flush 10 mL (not administered)   acetylcysteine (ACETADOTE) 15,300 mg in dextrose (D5W) 5 % 200 mL infusion (15,300 mg Intravenous New Bag 10/5/19 2116)   sodium chloride 0.9 % bolus 1,000 mL (1,000 mL Intravenous New Bag 10/5/19 1826)   ketorolac (TORADOL) injection 30 mg (30 mg Intravenous Given 10/5/19 1828)   ondansetron (ZOFRAN) injection 4 mg (4 mg Intravenous Given 10/5/19 1827)         PROGRESS, DATA ANALYSIS, CONSULTS, AND MEDICAL DECISION MAKING    All labs have been independently reviewed by me.  All radiology studies have been reviewed by me and discussed with radiologist dictating the report.   EKG's independently viewed and interpreted by me.  Discussion below represents my analysis of pertinent findings related to patient's condition, differential diagnosis, treatment plan and final disposition.      ED Course as of Oct 05 2150   Sat Oct 05, 2019   2148 Labs show normal CBC, elevated liver enzymes, negative hepatitis screen, microscopic hematuria which has nitrite positive likely due to the Pyridium.    She does have some mild MITRA  [DP]   2149 Her acetaminophen level is 10, and her INR is normal  [DP]   2149 CT the abdomen pelvis shows fatty infiltration of the liver with a  non-differentiated liver lesion.    Also shows possibility of a small collection of tiny uroliths at the right UVJ with no hydronephrosis.  [DP]   2149 In the setting of the history and the abnormal liver enzymes, I am going to initiate Acetadote therapy even though the Sinemet at the level is 0.  Ongoing ingestion at that dose could certainly cause subacute hepatotoxicity.  [DP]   2150 Discussed case with Dr. Corbin from [facility], who agrees to admit the patient to a telemetry bed for further evaluation treatment  [DP]      ED Course User Index  [DP] Dillon Villeda MD       AS OF 9:50 PM VITALS:    BP - 125/91  HR - 95  TEMP - 99.3 °F (37.4 °C) (Tympanic)  02 SATS - 96%        DIAGNOSIS  Final diagnoses:   Renal colic on right side   Hepatitis   Accidental acetaminophen overdose, initial encounter         DISPOSITION  Admit         Dillon Villeda MD  10/05/19 2150      Electronically signed by Dillon Villeda MD at 10/05/19 2150       Vital Signs (last day)     Date/Time   Temp   Temp src   Pulse   Resp   BP   Patient Position   SpO2    10/07/19 1405   98.1 (36.7)   Oral   74   18   126/79   Lying   93    10/07/19 0756   98.1 (36.7)   Oral   86   18   112/67   Lying   94    10/06/19 2309   97.9 (36.6)   Oral   85   18   94/57   Lying   92    10/06/19 1930   97.8 (36.6)   Oral   79   16   100/69   Lying   93    10/06/19 1353   --   --   91   16   99/51   Lying   94    10/06/19 0746   98.2 (36.8)   Oral   89   18   103/62   Lying   95    10/06/19 0507   --   --   101   --   --   --   --    10/06/19 0326   --   --   111   --   --   --   --    10/06/19 0230   --   --   128  (Abnormal)    --   99/68   --   92              Oxygen Therapy (last day)     Date/Time   SpO2   Device (Oxygen Therapy)   Flow (L/min)   Oxygen Concentration (%)   ETCO2 (mmHg)    10/07/19 1405   93   room air   --   --   --    10/07/19 0756   94   room air   --   --   --    10/06/19 2309   92   room air   --   --   --    10/06/19 1930   93   --    "--   --   --    10/06/19 1353   94   room air   --   --   --    10/06/19 0746   95   room air   --   --   --    10/06/19 0230   92   --   --   --   --              Lines, Drains & Airways    Active LDAs     Name:   Placement date:   Placement time:   Site:   Days:    Peripheral IV 10/05/19 2051 Right Hand   10/05/19    2051    Hand   1                Hospital Medications (active)       Dose Frequency Start End    cefTRIAXone (ROCEPHIN) IVPB 1 g 1 g Every 24 Hours 10/6/2019 10/9/2019    Sig - Route: Infuse 50 mL into a venous catheter Daily. - Intravenous    diphenhydrAMINE (BENADRYL) capsule 50 mg 50 mg Nightly PRN 10/6/2019     Sig - Route: Take 2 capsules by mouth At Night As Needed for Sleep. - Oral    famotidine (PEPCID) injection 20 mg 20 mg Every 12 Hours Scheduled 10/6/2019     Sig - Route: Infuse 2 mL into a venous catheter Every 12 (Twelve) Hours. - Intravenous    morphine injection 2 mg 2 mg Every 4 Hours PRN 10/6/2019 10/16/2019    Sig - Route: Infuse 1 mL into a venous catheter Every 4 (Four) Hours As Needed for Severe Pain . - Intravenous    ondansetron (ZOFRAN) injection 4 mg 4 mg Every 6 Hours PRN 10/6/2019     Sig - Route: Infuse 2 mL into a venous catheter Every 6 (Six) Hours As Needed for Nausea or Vomiting. - Intravenous    Linked Group 1:  \"Or\" Linked Group Details        ondansetron (ZOFRAN) tablet 4 mg 4 mg Every 6 Hours PRN 10/6/2019     Sig - Route: Take 1 tablet by mouth Every 6 (Six) Hours As Needed for Nausea or Vomiting. - Oral    Linked Group 1:  \"Or\" Linked Group Details        oxyCODONE (ROXICODONE) immediate release tablet 5 mg 5 mg Every 4 Hours PRN 10/7/2019 10/17/2019    Sig - Route: Take 1 tablet by mouth Every 4 (Four) Hours As Needed for Moderate Pain . - Oral    phenazopyridine (PYRIDIUM) tablet 200 mg 200 mg 3 Times Daily PRN 10/7/2019     Sig - Route: Take 1 tablet by mouth 3 (Three) Times a Day As Needed for bladder spasms. - Oral    sodium chloride 0.9 % flush 10 mL 10 mL " "As Needed 10/5/2019     Sig - Route: Infuse 10 mL into a venous catheter As Needed for Line Care. - Intravenous    Linked Group 2:  \"And\" Linked Group Details        sodium chloride 0.9 % flush 10 mL 10 mL Every 12 Hours Scheduled 10/6/2019     Sig - Route: Infuse 10 mL into a venous catheter Every 12 (Twelve) Hours. - Intravenous    sodium chloride 0.9 % flush 10 mL 10 mL As Needed 10/6/2019     Sig - Route: Infuse 10 mL into a venous catheter As Needed for Line Care. - Intravenous    sodium chloride 0.9 % infusion 125 mL/hr Continuous 10/6/2019     Sig - Route: Infuse 125 mL/hr into a venous catheter Continuous. - Intravenous    phenazopyridine (PYRIDIUM) tablet 200 mg (Discontinued) 200 mg 3 Times Daily 10/6/2019 10/7/2019    Sig - Route: Take 1 tablet by mouth 3 (Three) Times a Day. - Oral          Orders (active)     Start     Ordered    10/07/19 1514  Patient May Shower  Once      10/07/19 1513    10/07/19 1144  oxyCODONE (ROXICODONE) immediate release tablet 5 mg  Every 4 Hours PRN      10/07/19 1144    10/07/19 1115  phenazopyridine (PYRIDIUM) tablet 200 mg  3 Times Daily PRN      10/07/19 1113    10/07/19 1005  Diet Regular; Lactose Restricted  Diet Effective Now      10/07/19 1006    10/06/19 0400  Vital Signs  Every 4 Hours      10/06/19 0017    10/06/19 0230  famotidine (PEPCID) injection 20 mg  Every 12 Hours Scheduled      10/06/19 0020    10/06/19 0115  cefTRIAXone (ROCEPHIN) IVPB 1 g  Every 24 Hours      10/06/19 0015    10/06/19 0115  sodium chloride 0.9 % flush 10 mL  Every 12 Hours Scheduled      10/06/19 0017    10/06/19 0115  sodium chloride 0.9 % infusion  Continuous      10/06/19 0018    10/06/19 0018  Maintain Sequential Compression Device  Continuous      10/06/19 0017    10/06/19 0017  Code Status and Medical Interventions:  Continuous      10/06/19 0017    10/06/19 0017  Intake & Output  Every Shift      10/06/19 0017    10/06/19 0017  Weigh patient  Once      10/06/19 0017    10/06/19 " 7  Oxygen Therapy- Nasal Cannula; Titrate for SPO2: 90% - 95%  Continuous      10/06/19 0017    10/06/19 0017  Insert Peripheral IV  Once      10/06/19 0017    10/06/19 0017  Saline Lock & Maintain IV Access  Continuous      10/06/19 0017    10/06/19 001  sodium chloride 0.9 % flush 10 mL  As Needed      10/06/19 0017    10/06/19 001  diphenhydrAMINE (BENADRYL) capsule 50 mg  Nightly PRN      10/06/19 0016    10/06/19 001  ondansetron (ZOFRAN) tablet 4 mg  Every 6 Hours PRN      10/06/19 0016    10/06/19 001  ondansetron (ZOFRAN) injection 4 mg  Every 6 Hours PRN      10/06/19 0016    10/06/19 001  morphine injection 2 mg  Every 4 Hours PRN      10/06/19 0016    10/05/19 1810  Insert peripheral IV  Once      10/05/19 1809    10/05/19 180  sodium chloride 0.9 % flush 10 mL  As Needed      10/05/19 180    Unscheduled  Up with assistance  As Needed      10/06/19 0017    Pending  Opioid Administration - Capnography (EtCO2) Monitoring  Continuous      Pending    Pending  Opioid Administration - Document EtCO2 Value With Each Set of Vitals & Any Change in Patient Status  Continuous      Pending    Pending  Opioid Administration - Notify Provider Capnography (EtCO2)  Until Discontinued      Pending               Physician Progress Notes       Felipe Paez MD at 10/07/19 1145              Name: Val Pettit ADMIT: 10/5/2019   : 1970  PCP: Xu Kyle MD    MRN: [id number] LOS: 2 days   AGE/SEX: 49 y.o. female  ROOM: 01/1     Subjective   Subjective   CC: abdominal/flank pain  No acute events. Right flank pain continues-she is concerned because this has been going on for 3 weeks.  Taking PO. No CP/dyspnea/f/c. +nausea, no vomiting or diarrhea.    Objective   Objective   Vital Signs  Temp:  [97.8 °F (36.6 °C)-98.1 °F (36.7 °C)] 98.1 °F (36.7 °C)  Heart Rate:  [79-91] 86  Resp:  [16-18] 18  BP: ()/(51-69) 112/67  SpO2:  [92 %-94 %] 94 %  on   ;   Device (Oxygen Therapy): room  air  Body mass index is 38.06 kg/m².  Physical Exam   Constitutional: She is oriented to person, place, and time. No distress.   HENT:   Head: Normocephalic and atraumatic.   Mouth/Throat: Oropharynx is clear and moist.   Eyes: Conjunctivae and EOM are normal. Pupils are equal, round, and reactive to light.   Neck: Normal range of motion. Neck supple.   Cardiovascular: Normal rate, regular rhythm and intact distal pulses.   Pulmonary/Chest: Effort normal and breath sounds normal.   Abdominal: Soft. Bowel sounds are normal. There is no tenderness.   Musculoskeletal: She exhibits no edema or tenderness.   Neurological: She is alert and oriented to person, place, and time.   Skin: Skin is warm and dry. She is not diaphoretic.   Psychiatric: She has a normal mood and affect. Her behavior is normal.   Nursing note and vitals reviewed.      Results Review:       I reviewed the patient's new clinical results.  Results from last 7 days   Lab Units 10/07/19  0309 10/06/19  1716 10/05/19  1821   WBC 10*3/mm3 5.58 5.53 4.41   HEMOGLOBIN g/dL 9.9* 11.2* 13.4   PLATELETS 10*3/mm3 166 168 209     Results from last 7 days   Lab Units 10/07/19  0309 10/06/19  1606 10/06/19  0319 10/05/19  1821   SODIUM mmol/L 141 140 138 137   POTASSIUM mmol/L 4.3 4.1 3.8 4.0   CHLORIDE mmol/L 108* 108* 104 102   CO2 mmol/L 23.8 22.9 18.2* 20.2*   BUN mg/dL 7 8 10 8   CREATININE mg/dL 1.01* 0.97 1.19* 1.29*   GLUCOSE mg/dL 89 90 104* 108*   Estimated Creatinine Clearance: 79.9 mL/min (A) (by C-G formula based on SCr of 1.01 mg/dL (H)).  Results from last 7 days   Lab Units 10/07/19  0309 10/06/19  1606 10/06/19  0319 10/05/19  1821   ALBUMIN g/dL 3.30* 3.50 3.40* 4.20   BILIRUBIN mg/dL 0.5 0.8 1.1 0.7   ALK PHOS U/L 115 121* 115 148*   AST (SGOT) U/L 256* 397* 422* 620*   ALT (SGPT) U/L 307* 356* 362* 431*     Results from last 7 days   Lab Units 10/07/19  0309 10/06/19  1606 10/06/19  0319 10/05/19  1821   CALCIUM mg/dL 7.8* 7.6* 7.5* 8.9    ALBUMIN g/dL 3.30* 3.50 3.40* 4.20       No results found for: HGBA1C, POCGLU      ceftriaxone 1 g Intravenous Q24H   famotidine 20 mg Intravenous Q12H   sodium chloride 10 mL Intravenous Q12H       sodium chloride 125 mL/hr Last Rate: 125 mL/hr (10/07/19 0827)   Diet Regular; Lactose Restricted      Assessment/Plan     Active Hospital Problems    Diagnosis  POA   • **Hepatitis [K75.9]  Unknown   • LFT elevation [R94.5]  Unknown   • UTI (urinary tract infection), bacterial [N39.0, A49.9]  Unknown   • Hypotension [I95.9]  Unknown   • Tachycardia [R00.0]  Unknown   • Left upper quadrant pain [R10.12]  Yes      Resolved Hospital Problems   No resolved problems to display.   Acute Hepatitis  - hepatis panel is negative  - improving-no indication for NAC  - continue supportive care, IVF  - of course no more tylenol for now  - GI following, appreciate recs    UTI  - continue on ceftriaxone  - pyridium for dysuria    Right flank Pain  - has what looks like some right ureterolithiasis  - she c/o ongoing pain for the past 3 weeks in this area-stones are small but given the duration of symptoms will ask urology to evaluate    SCDs for DVT prophylaxis.  Full code.  Discussed with patient and nursing staff.  Anticipate discharge home possibly later today depending on urology recs      Felipe Paez MD  [facility]  10/07/19  11:45 AM          Electronically signed by Felipe Paez MD at 10/07/19 1148     JoseBannerGriffin MD at 10/07/19 0911          [facility]  Inpatient Progress Note    Reason for Follow Up:  Elevated liver enzymes    Subjective     Interval History:   She is without GI complaints.    Current Facility-Administered Medications:   •  cefTRIAXone (ROCEPHIN) IVPB 1 g, 1 g, Intravenous, Q24H, Anthony Corbin MD, Last Rate: 100 mL/hr at 10/07/19 0031, 1 g at 10/07/19 0031  •  diphenhydrAMINE (BENADRYL) capsule 50 mg, 50 mg, Oral, Nightly PRN,  Anthony Corbin MD, 50 mg at 10/06/19 2126  •  famotidine (PEPCID) injection 20 mg, 20 mg, Intravenous, Q12H, Anthony Corbin MD, 20 mg at 10/07/19 0833  •  morphine injection 2 mg, 2 mg, Intravenous, Q4H PRN, Anthony Corbin MD, 2 mg at 10/07/19 0833  •  ondansetron (ZOFRAN) tablet 4 mg, 4 mg, Oral, Q6H PRN **OR** ondansetron (ZOFRAN) injection 4 mg, 4 mg, Intravenous, Q6H PRN, Anthony Corbin MD, 4 mg at 10/06/19 0744  •  phenazopyridine (PYRIDIUM) tablet 200 mg, 200 mg, Oral, TID, Felipe Paez MD, 200 mg at 10/07/19 0833  •  [COMPLETED] Insert peripheral IV, , , Once **AND** sodium chloride 0.9 % flush 10 mL, 10 mL, Intravenous, PRN, Rolando Lemus MD, 10 mL at 10/06/19 0237  •  sodium chloride 0.9 % flush 10 mL, 10 mL, Intravenous, Q12H, Anthony Corbin MD, 10 mL at 10/07/19 0833  •  sodium chloride 0.9 % flush 10 mL, 10 mL, Intravenous, PRN, Anthony Corbin MD  •  sodium chloride 0.9 % infusion, 125 mL/hr, Intravenous, Continuous, Anthony Corbin MD, Last Rate: 125 mL/hr at 10/07/19 0827, 125 mL/hr at 10/07/19 0827  Review of Systems:    The following systems were reviewed and negative;  constitution and gastrointestinal    Objective     Vital Signs  Temp:  [97.8 °F (36.6 °C)-98.1 °F (36.7 °C)] 98.1 °F (36.7 °C)  Heart Rate:  [79-91] 86  Resp:  [16-18] 18  BP: ()/(51-69) 112/67  Body mass index is 38.06 kg/m².    Intake/Output Summary (Last 24 hours) at 10/7/2019 0911  Last data filed at 10/7/2019 0827  Gross per 24 hour   Intake 2120 ml   Output 1000 ml   Net 1120 ml     I/O this shift:  In: 1000 [I.V.:1000]  Out: -      Physical Exam:   General: patient awake, alert and cooperative   Eyes: Normal lids and lashes, no scleral icterus   Neck: supple, normal ROM   Skin: warm and dry, not jaundiced   Cardiovascular: regular rhythm and rate, no murmurs auscultated   Pulm: clear to auscultation bilaterally, regular and unlabored   Abdomen: soft, nontender,  nondistended; normal bowel sounds   Rectal: deferred   Extremities: no rash or edema   Psychiatric: Normal mood and behavior; memory intact     Results Review:     I reviewed the patient's new clinical results.    Results from last 7 days   Lab Units 10/07/19  0309 10/06/19  1716 10/05/19  1821   WBC 10*3/mm3 5.58 5.53 4.41   HEMOGLOBIN g/dL 9.9* 11.2* 13.4   HEMATOCRIT % 30.9* 34.9 41.9   PLATELETS 10*3/mm3 166 168 209     Results from last 7 days   Lab Units 10/07/19  0309 10/06/19  1606 10/06/19  0319   SODIUM mmol/L 141 140 138   POTASSIUM mmol/L 4.3 4.1 3.8   CHLORIDE mmol/L 108* 108* 104   CO2 mmol/L 23.8 22.9 18.2*   BUN mg/dL 7 8 10   CREATININE mg/dL 1.01* 0.97 1.19*   CALCIUM mg/dL 7.8* 7.6* 7.5*   BILIRUBIN mg/dL 0.5 0.8 1.1   ALK PHOS U/L 115 121* 115   ALT (SGPT) U/L 307* 356* 362*   AST (SGOT) U/L 256* 397* 422*   GLUCOSE mg/dL 89 90 104*     Results from last 7 days   Lab Units 10/07/19  0309 10/06/19  1607 10/05/19  2050   INR  0.98 1.01 0.99     Lab Results   Lab Value Date/Time    LIPASE 20 10/05/2019 1821         Assessment/Plan   Assessment:   1.   Elevated liver enzymes - hepatocellular pattern, improving  2.   Recent bactrim use  3.   Recent tylenol use  4.   Hepatic steatosis    Plan:   LFTs improving with continued normalization of INR.  There is concern about antecedent excess use of tylenol, but no indication for NAC at this point.  I also wonder if there could be an element of DILI from recent bactrim use, as she did have modest absolute eosinophilia on admission.  Either way the treatment would appear to be supportive at this point given continued improvement in LFTs.  She will need close outpt monitoring of LFTs upon discharge.      I discussed the patients findings and my recommendations with patient.         Griffin Lorenz M.D.  [facility], [place], operated [facility]  [address] [place] [address]  Office: [phone]              Electronically signed by  Griffin Lorenz MD at 10/07/19 0915     Felipe Paez MD at 10/06/19 1047              Name: Val Pettit ADMIT: 10/5/2019   : 1970  PCP: Xu Kyle MD    MRN: [id number] LOS: 1 days   AGE/SEX: 49 y.o. female  ROOM: [facility]     Subjective   Subjective   CC: abdominal/flank pain  No acute events. Abdominal symptoms have much improved.  No new complaints.  Taking PO. No CP/dyspnea/f/c. +nausea, no vomiting or diarrhea.    Objective   Objective   Vital Signs  Temp:  [97.7 °F (36.5 °C)-99.3 °F (37.4 °C)] 98.2 °F (36.8 °C)  Heart Rate:  [] 91  Resp:  [16-21] 16  BP: ()/() 99/51  SpO2:  [92 %-96 %] 94 %  on   ;   Device (Oxygen Therapy): room air  Body mass index is 38.06 kg/m².  Physical Exam   Constitutional: She is oriented to person, place, and time. No distress.   HENT:   Head: Normocephalic and atraumatic.   Mouth/Throat: Oropharynx is clear and moist.   Eyes: Conjunctivae and EOM are normal. Pupils are equal, round, and reactive to light.   Neck: Normal range of motion. Neck supple.   Cardiovascular: Normal rate, regular rhythm and intact distal pulses.   Pulmonary/Chest: Effort normal and breath sounds normal.   Abdominal: Soft. Bowel sounds are normal. There is no tenderness.   Musculoskeletal: She exhibits no edema or tenderness.   Neurological: She is alert and oriented to person, place, and time.   Skin: Skin is warm and dry. She is not diaphoretic.   Psychiatric: She has a normal mood and affect. Her behavior is normal.   Nursing note and vitals reviewed.      Results Review:       I reviewed the patient's new clinical results.  Results from last 7 days   Lab Units 10/05/19  1821   WBC 10*3/mm3 4.41   HEMOGLOBIN g/dL 13.4   PLATELETS 10*3/mm3 209     Results from last 7 days   Lab Units 10/06/19  0319 10/05/19  1821   SODIUM mmol/L 138 137   POTASSIUM mmol/L 3.8 4.0   CHLORIDE mmol/L 104 102   CO2 mmol/L 18.2* 20.2*   BUN mg/dL 10 8   CREATININE mg/dL 1.19*  1.29*   GLUCOSE mg/dL 104* 108*   Estimated Creatinine Clearance: 67.8 mL/min (A) (by C-G formula based on SCr of 1.19 mg/dL (H)).  Results from last 7 days   Lab Units 10/06/19  0319 10/05/19  1821   ALBUMIN g/dL 3.40* 4.20   BILIRUBIN mg/dL 1.1 0.7   ALK PHOS U/L 115 148*   AST (SGOT) U/L 422* 620*   ALT (SGPT) U/L 362* 431*     Results from last 7 days   Lab Units 10/06/19  0319 10/05/19  1821   CALCIUM mg/dL 7.5* 8.9   ALBUMIN g/dL 3.40* 4.20       No results found for: HGBA1C, POCGLU      ceftriaxone 1 g Intravenous Q24H   famotidine 20 mg Intravenous Q12H   sodium chloride 10 mL Intravenous Q12H       sodium chloride 125 mL/hr Last Rate: 125 mL/hr (10/06/19 0800)   Diet Full Liquid      Assessment/Plan     Active Hospital Problems    Diagnosis  POA   • **Hepatitis [K75.9]  Unknown   • LFT elevation [R94.5]  Unknown   • UTI (urinary tract infection), bacterial [N39.0, A49.9]  Unknown   • Hypotension [I95.9]  Unknown   • Tachycardia [R00.0]  Unknown   • Left upper quadrant pain [R10.12]  Yes      Resolved Hospital Problems   No resolved problems to display.   Acute Hepatitis  - hepatis panel is negative  - per patient history she has been taking more than the daily limit of tylenol-she last took this at about 2PM yesterday and about 4.5h later her acetaminophen level was 10.0mcg/mL, well below NAC treatment threshold  - continue supportive care, IVF  - of course no more tylenol for now  - GI following, appreciate recs    UTI  - continue on ceftriaxone  - pyridium for dysuria    SCDs for DVT prophylaxis.  Full code.  Discussed with patient and nursing staff.  Anticipate discharge home in 1-2 days.      Felipe Paez MD  [facility]  10/06/19  3:58 PM          Electronically signed by Felipe Paez MD at 10/06/19 8927          Consult Notes       Satish Sibley Jr., MD at 10/07/19 1315      Consult Orders    1. Inpatient Urology Consult [[id number]] ordered by Felipe Paez  MD at 10/07/19 1145                       FIRST UROLOGY CONSULT      Patient Identification:  NAME:  Val Pettit  Age:  49 y.o.   Sex:  female   :  1970   MRN:  [id number]       Chief complaint: Right flank pain    History of present illness:  This is a 49 year old female who reports a 3-week history of intermittent right flank pain. Sharp, severe. Associated with urgency and frequency. She presented to urgent care, where microscopic hematuria was found. She was treated for a presumed UTI. Culture was ultimately negative. She developed nausea, vomiting with no improvement in the pain. She presented to the ER for further management. CT abd/pelvis demonstrates a cluster of stones in the right distal ureter, largest 4mm. Minimal hydronephrosis.      Past medical history:  Past Medical History:   Diagnosis Date   • Arthritis    • GERD (gastroesophageal reflux disease)    • History of transfusion    • Hypotension    • IBS (irritable bowel syndrome)    • PONV (postoperative nausea and vomiting)        Past surgical history:  Past Surgical History:   Procedure Laterality Date   • ANKLE SURGERY Right    • BACK SURGERY      herniated disc removal   •  SECTION     • CHOLECYSTECTOMY     • COLONOSCOPY     • ENDOSCOPY     • JOINT REPLACEMENT     • KNEE SURGERY Bilateral     x3 each   • SHOULDER SURGERY Left     x2   • SKIN BIOPSY         Allergies:  Egg white [albumen, egg]; Erythromycin; Lactose intolerance (gi); Meperidine; and [place] juice    Home medications:  Medications Prior to Admission   Medication Sig Dispense Refill Last Dose   • Acetaminophen (ACETAMIN PO) Take 650 mg by mouth Every 6 (Six) Hours As Needed (for headaches and  kidney stone pain).      • diphenhydrAMINE (BENADRYL) 50 MG capsule Take 50 mg by mouth At Night As Needed for Allergies or Sleep.      • phenazopyridine (PYRIDIUM) 200 MG tablet Take 200 mg by mouth 3 (Three) Times a Day. For bladder spasms x 5 days (started 10/1/19)      •  sulfamethoxazole-trimethoprim (BACTRIM DS,SEPTRA DS) 800-160 MG per tablet Take 1 tablet by mouth 2 (Two) Times a Day. For UTI x 10 days (started 10/1/19)           Hospital medications:    ceftriaxone 1 g Intravenous Q24H   famotidine 20 mg Intravenous Q12H   sodium chloride 10 mL Intravenous Q12H       sodium chloride 125 mL/hr Last Rate: 125 mL/hr (10/07/19 0827)     diphenhydrAMINE  •  Morphine  •  ondansetron **OR** ondansetron  •  oxyCODONE  •  phenazopyridine  •  [COMPLETED] Insert peripheral IV **AND** sodium chloride  •  sodium chloride    Family history:  History reviewed. No pertinent family history.    Social history:  Social History     Tobacco Use   • Smoking status: Never Smoker   • Smokeless tobacco: Never Used   Substance Use Topics   • Alcohol use: No     Frequency: Never   • Drug use: No       Review of systems:      Positive for:  As per HPI  Negative for:  Fevers, chills, blurry vision, headaches, sore throat, hearing loss, enlarged cervical lymph nodes, chest pain, shortness of breath, palpitations, wheezing, diarrhea, constipation, hematochezia, depressed mood, anxiety, rash, joint pain, weakness, numbness.    Objective:  TMax 24 hours:   Temp (24hrs), Av.9 °F (36.6 °C), Min:97.8 °F (36.6 °C), Max:98.1 °F (36.7 °C)      Vitals Ranges:   Temp:  [97.8 °F (36.6 °C)-98.1 °F (36.7 °C)] 98.1 °F (36.7 °C)  Heart Rate:  [79-91] 86  Resp:  [16-18] 18  BP: ()/(51-69) 112/67    Intake/Output Last 3 shifts:  I/O last 3 completed shifts:  In: 4011.7 [P.O.:120; I.V.:2791.7; IV Piggyback:1100]  Out: 1000 [Urine:1000]     Physical Exam:    General Appearance:    Alert, cooperative, NAD   HEENT:    No trauma, pupils reactive, hearing intact   Back:     No CVA tenderness   Lungs:     Respirations unlabored, no wheezing    Heart:    RRR, intact peripheral pulses   Abdomen:     Soft, NDNT, no masses, no guarding   :    Pelvic not performed, bladder non distended and non tender   Extremities:   No  edema, no deformity   Lymphatic:   No neck or groin LAD   Skin:   No bleeding, bruising or rashes   Neuro/Psych:   Orientation intact, mood/affect pleasant, no focal findings       Results review:   I reviewed the patient's new clinical results.    Data review:  Lab Results (last 24 hours)     Procedure Component Value Units Date/Time    CBC & Differential [[id number]] Collected:  10/07/19 0309    Specimen:  Blood Updated:  10/07/19 0425    Narrative:       The following orders were created for panel order CBC & Differential.  Procedure                               Abnormality         Status                     ---------                               -----------         ------                     CBC Auto Differential[[id number]]        Abnormal            Final result                 Please view results for these tests on the individual orders.    CBC Auto Differential [[id number]]  (Abnormal) Collected:  10/07/19 0309    Specimen:  Blood Updated:  10/07/19 0425     WBC 5.58 10*3/mm3      RBC 3.62 10*6/mm3      Hemoglobin 9.9 g/dL      Hematocrit 30.9 %      MCV 85.4 fL      MCH 27.3 pg      MCHC 32.0 g/dL      RDW 13.9 %      RDW-SD 43.6 fl      MPV 10.1 fL      Platelets 166 10*3/mm3     Manual Differential [[id number]]  (Abnormal) Collected:  10/07/19 0309    Specimen:  Blood Updated:  10/07/19 0425     Neutrophil % 58.8 %      Lymphocyte % 32.0 %      Monocyte % 3.1 %      Eosinophil % 5.2 %      Atypical Lymphocyte % 1.0 %      Neutrophils Absolute 3.28 10*3/mm3      Lymphocytes Absolute 1.79 10*3/mm3      Monocytes Absolute 0.17 10*3/mm3      Eosinophils Absolute 0.29 10*3/mm3      RBC Morphology Normal     WBC Morphology Normal     Giant Platelets Large/3+    Comprehensive Metabolic Panel [[id number]]  (Abnormal) Collected:  10/07/19 0309    Specimen:  Blood Updated:  10/07/19 0424     Glucose 89 mg/dL      BUN 7 mg/dL      Creatinine 1.01 mg/dL      Sodium 141 mmol/L      Potassium 4.3 mmol/L      Chloride 108  mmol/L      CO2 23.8 mmol/L      Calcium 7.8 mg/dL      Total Protein 5.4 g/dL      Albumin 3.30 g/dL      ALT (SGPT) 307 U/L      AST (SGOT) 256 U/L      Alkaline Phosphatase 115 U/L      Total Bilirubin 0.5 mg/dL      eGFR Non African Amer 59 mL/min/1.73      Globulin 2.1 gm/dL      A/G Ratio 1.6 g/dL      BUN/Creatinine Ratio 6.9     Anion Gap 9.2 mmol/L     Narrative:       GFR Normal >60  Chronic Kidney Disease <60  Kidney Failure <15    Protime-INR [[id number]]  (Normal) Collected:  10/07/19 0309    Specimen:  Blood Updated:  10/07/19 0408     Protime 12.7 Seconds      INR 0.98    CBC (No Diff) [[id number]]  (Abnormal) Collected:  10/06/19 1716    Specimen:  Blood from Arm, Left Updated:  10/06/19 1734     WBC 5.53 10*3/mm3      RBC 4.14 10*6/mm3      Hemoglobin 11.2 g/dL      Hematocrit 34.9 %      MCV 84.3 fL      MCH 27.1 pg      MCHC 32.1 g/dL      RDW 14.0 %      RDW-SD 43.5 fl      MPV 9.5 fL      Platelets 168 10*3/mm3     Comprehensive Metabolic Panel [[id number]]  (Abnormal) Collected:  10/06/19 1606    Specimen:  Blood from Arm, Left Updated:  10/06/19 1646     Glucose 90 mg/dL      BUN 8 mg/dL      Creatinine 0.97 mg/dL      Sodium 140 mmol/L      Potassium 4.1 mmol/L      Chloride 108 mmol/L      CO2 22.9 mmol/L      Calcium 7.6 mg/dL      Total Protein 5.7 g/dL      Albumin 3.50 g/dL      ALT (SGPT) 356 U/L      AST (SGOT) 397 U/L      Alkaline Phosphatase 121 U/L      Total Bilirubin 0.8 mg/dL      eGFR Non African Amer 61 mL/min/1.73      Globulin 2.2 gm/dL      A/G Ratio 1.6 g/dL      BUN/Creatinine Ratio 8.2     Anion Gap 9.1 mmol/L     Narrative:       GFR Normal >60  Chronic Kidney Disease <60  Kidney Failure <15    Protime-INR [[id number]]  (Normal) Collected:  10/06/19 1607    Specimen:  Blood from Arm, Left Updated:  10/06/19 1627     Protime 13.0 Seconds      INR 1.01           Imaging:  Imaging Results (last 24 hours)     ** No results found for the last 24 hours. **             Assessment:        Hepatitis    Left upper quadrant pain    LFT elevation    UTI (urinary tract infection), bacterial    Hypotension    Tachycardia    Right ureteral stone    Plan:     - discussed options including trial of passage, ureteroscopy and laser lithotripsy, ESWL. After weighing the risks, she would like to proceed with ureteroscopy. We will schedule the procedure, likely on Wednesday. She is safe for discharge on pain/nausea meds and antibiotics in the interim. Strain all urine.    Satish Sibley Jr., MD  10/07/19  1:16 PM        Electronically signed by Satish Sibley Jr., MD at 10/07/19 1323     Griffin Mckeon MD at 10/06/19 1508      Consult Orders    1. Inpatient Gastroenterology Consult [[id number]] ordered by Anthony Corbin MD at 10/06/19 0015                [facility]  Initial Inpatient Consult Note    Referring Provider: Dr. Corbin    Reason for Consultation: Elevated liver tests    Subjective     History of present illness:    49 y.o. female with a history of kidney stones who has been taking Tylenol roughly 2 g a day for the last 5 days, occasionally more doses, had nausea vomiting abdominal discomfort in the right upper quadrant.  Dysuria, may have been bloody urine, came to the emergency room.  Found to have elevated liver test.  She is also been taking Pyridium as well as Bactrim for UTI.  She is never had problems with her liver before.  Tylenol level in the emergency room was 10, N-acetylcysteine was started but then discontinued.  At the time I am seeing her now 12 hours later she is asymptomatic.    She does not use alcohol or illicit drugs.  No other hepatotoxic medications her med list    Past Medical History:  Past Medical History:   Diagnosis Date   • Arthritis    • GERD (gastroesophageal reflux disease)    • History of transfusion    • Hypotension    • IBS (irritable bowel syndrome)    • PONV (postoperative nausea and vomiting)      Past Surgical  History:  Past Surgical History:   Procedure Laterality Date   • ANKLE SURGERY Right    • BACK SURGERY      herniated disc removal   •  SECTION     • CHOLECYSTECTOMY     • COLONOSCOPY     • ENDOSCOPY     • JOINT REPLACEMENT     • KNEE SURGERY Bilateral     x3 each   • SHOULDER SURGERY Left     x2   • SKIN BIOPSY        Social History:   Social History     Tobacco Use   • Smoking status: Never Smoker   • Smokeless tobacco: Never Used   Substance Use Topics   • Alcohol use: No     Frequency: Never      Family History:  History reviewed. No pertinent family history.    Home Meds:  Medications Prior to Admission   Medication Sig Dispense Refill Last Dose   • Acetaminophen (ACETAMIN PO) Take 650 mg by mouth Every 6 (Six) Hours As Needed.      • diphenhydrAMINE (BENADRYL) 50 MG capsule Take 50 mg by mouth At Night As Needed.      • phenazopyridine (PYRIDIUM) 200 MG tablet Take 200 mg by mouth 3 (Three) Times a Day.      • sulfamethoxazole-trimethoprim (BACTRIM DS,SEPTRA DS) 800-160 MG per tablet Take 1 tablet by mouth 2 (Two) Times a Day.        Current Meds:     ceftriaxone 1 g Intravenous Q24H   famotidine 20 mg Intravenous Q12H   sodium chloride 10 mL Intravenous Q12H     Allergies:  Allergies   Allergen Reactions   • Egg White [Albumen, Egg] Anaphylaxis   • Erythromycin Diarrhea, Nausea And Vomiting and GI Intolerance   • Lactose Intolerance (Gi) Diarrhea   • Meperidine Other (See Comments)     Bradycardia & lightheadedness   • Orange Juice Diarrhea     Review of Systems  She has weakness fatigue all other systems reviewed and negative     Objective     Vital Signs  Temp:  [97.7 °F (36.5 °C)-99.3 °F (37.4 °C)] 98.2 °F (36.8 °C)  Heart Rate:  [] 91  Resp:  [16-21] 16  BP: ()/() 99/51  Physical Exam:  General Appearance:    Alert, cooperative, in no acute distress   Head:    Normocephalic, without obvious abnormality, atraumatic   Eyes:          conjunctivae and sclerae normal, no   icterus    Throat:   no thrush, oral mucosa moist   Neck:   Supple, no adenopathy   Lungs:     Clear to auscultation bilaterally    Heart:    Regular rhythm and normal rate    Chest Wall:    No abnormalities observed   Abdomen:     Soft, nondistended, nontender; normal bowel sounds   Extremities:   no edema, no redness   Skin:   No bruising or rash   Psychiatric:  normal mood and insight     Results Review:   I reviewed the patient's new clinical results.    Results from last 7 days   Lab Units 10/05/19  1821   WBC 10*3/mm3 4.41   HEMOGLOBIN g/dL 13.4   HEMATOCRIT % 41.9   PLATELETS 10*3/mm3 209     Results from last 7 days   Lab Units 10/06/19  0319 10/05/19  1821   SODIUM mmol/L 138 137   POTASSIUM mmol/L 3.8 4.0   CHLORIDE mmol/L 104 102   CO2 mmol/L 18.2* 20.2*   BUN mg/dL 10 8   CREATININE mg/dL 1.19* 1.29*   CALCIUM mg/dL 7.5* 8.9   BILIRUBIN mg/dL 1.1 0.7   ALK PHOS U/L 115 148*   ALT (SGPT) U/L 362* 431*   AST (SGOT) U/L 422* 620*   GLUCOSE mg/dL 104* 108*     Results from last 7 days   Lab Units 10/05/19  2050   INR  0.99     Lab Results   Lab Value Date/Time    LIPASE 20 10/05/2019 1821       Radiology:  CT Abdomen Pelvis Without Contrast   Final Result   1.  Fatty liver with 14 mm low-density right hepatic lobe lesion.   Follow-up recommended.   2. 2.9 cm parapelvic left renal cyst.   3. Mild diverticulosis.   4. Probable collection of tiny stones in the distal right ureter as   discussed. No significant hydronephrosis                   This report was finalized on 10/5/2019 11:00 PM by Felipe Jacobsen M.D.              Assessment/Plan   Patient Active Problem List   Diagnosis   • Left upper quadrant pain   • Hepatitis   • LFT elevation   • UTI (urinary tract infection), bacterial   • Hypotension   • Tachycardia       Assessment:  1. Elevated liver tests  2. Tylenol usage    Plan:  · I will recheck liver test this afternoon if they continue to improve and INR is normal we can hold on any further  treatment  · Supportive care  · If the numbers are worsening I am reinitiating N-acetylcysteine      I discussed the patients findings and my recommendations with patient and nursing staff.    Griffin Mckeon MD            Electronically signed by Griffin Mckeon MD at 10/06/19 1512                 Epley, Shannon, RN      Case Management   Progress Notes   Signed   Date of Service:  10/07/19 1522   Creation Time:  10/07/19 1522            Signed             Discharge Planning Assessment  [facility]     Patient Name: Val Pettit                 MRN: [id number]  Today's Date: 10/7/2019                     Admit Date: 10/5/2019              Discharge Plan      Row Name 10/07/19 1520           Plan     Plan  home with family     Patient/Family in Agreement with Plan  yes     Plan Comments  Facesheet verified.  Family will give ride at D/C. Patient denies needs or concerns.  CCP will follow.

## 2019-10-08 PROCEDURE — 25010000002 HYDROMORPHONE 1 MG/ML SOLUTION: Performed by: UROLOGY

## 2019-10-08 PROCEDURE — 25010000002 MORPHINE PER 10 MG: Performed by: HOSPITALIST

## 2019-10-08 PROCEDURE — 63710000001 DIPHENHYDRAMINE PER 50 MG: Performed by: HOSPITALIST

## 2019-10-08 PROCEDURE — 25010000002 ONDANSETRON PER 1 MG: Performed by: HOSPITALIST

## 2019-10-08 PROCEDURE — 25010000002 CEFTRIAXONE PER 250 MG: Performed by: INTERNAL MEDICINE

## 2019-10-08 PROCEDURE — 99232 SBSQ HOSP IP/OBS MODERATE 35: CPT | Performed by: INTERNAL MEDICINE

## 2019-10-08 PROCEDURE — 25010000002 CEFTRIAXONE PER 250 MG: Performed by: HOSPITALIST

## 2019-10-08 RX ORDER — HYDROMORPHONE HYDROCHLORIDE 1 MG/ML
0.5 INJECTION, SOLUTION INTRAMUSCULAR; INTRAVENOUS; SUBCUTANEOUS
Status: DISCONTINUED | OUTPATIENT
Start: 2019-10-08 | End: 2019-10-08

## 2019-10-08 RX ORDER — CEFTRIAXONE SODIUM 1 G/50ML
1 INJECTION, SOLUTION INTRAVENOUS EVERY 24 HOURS
Status: COMPLETED | OUTPATIENT
Start: 2019-10-08 | End: 2019-10-09

## 2019-10-08 RX ADMIN — HYDROMORPHONE HYDROCHLORIDE 1 MG: 1 INJECTION, SOLUTION INTRAMUSCULAR; INTRAVENOUS; SUBCUTANEOUS at 22:10

## 2019-10-08 RX ADMIN — POLYETHYLENE GLYCOL 3350 17 G: 17 POWDER, FOR SOLUTION ORAL at 20:08

## 2019-10-08 RX ADMIN — ONDANSETRON 4 MG: 2 INJECTION INTRAMUSCULAR; INTRAVENOUS at 23:06

## 2019-10-08 RX ADMIN — OXYCODONE HYDROCHLORIDE 5 MG: 5 TABLET ORAL at 17:05

## 2019-10-08 RX ADMIN — OXYCODONE HYDROCHLORIDE 5 MG: 5 TABLET ORAL at 12:48

## 2019-10-08 RX ADMIN — FAMOTIDINE 20 MG: 10 INJECTION INTRAVENOUS at 20:08

## 2019-10-08 RX ADMIN — ONDANSETRON 4 MG: 2 INJECTION INTRAMUSCULAR; INTRAVENOUS at 11:32

## 2019-10-08 RX ADMIN — MORPHINE SULFATE 2 MG: 2 INJECTION, SOLUTION INTRAMUSCULAR; INTRAVENOUS at 10:08

## 2019-10-08 RX ADMIN — CEFTRIAXONE SODIUM 1 G: 1 INJECTION, SOLUTION INTRAVENOUS at 02:13

## 2019-10-08 RX ADMIN — FAMOTIDINE 20 MG: 10 INJECTION INTRAVENOUS at 08:36

## 2019-10-08 RX ADMIN — MORPHINE SULFATE 2 MG: 2 INJECTION, SOLUTION INTRAMUSCULAR; INTRAVENOUS at 03:02

## 2019-10-08 RX ADMIN — HYDROMORPHONE HYDROCHLORIDE 1 MG: 1 INJECTION, SOLUTION INTRAMUSCULAR; INTRAVENOUS; SUBCUTANEOUS at 19:31

## 2019-10-08 RX ADMIN — SODIUM CHLORIDE 125 ML/HR: 9 INJECTION, SOLUTION INTRAVENOUS at 10:08

## 2019-10-08 RX ADMIN — PHENAZOPYRIDINE 200 MG: 200 TABLET ORAL at 17:11

## 2019-10-08 RX ADMIN — SODIUM CHLORIDE 125 ML/HR: 9 INJECTION, SOLUTION INTRAVENOUS at 02:13

## 2019-10-08 RX ADMIN — SODIUM CHLORIDE, PRESERVATIVE FREE 10 ML: 5 INJECTION INTRAVENOUS at 08:36

## 2019-10-08 RX ADMIN — HYDROMORPHONE HYDROCHLORIDE 1 MG: 1 INJECTION, SOLUTION INTRAMUSCULAR; INTRAVENOUS; SUBCUTANEOUS at 13:19

## 2019-10-08 RX ADMIN — DIPHENHYDRAMINE HYDROCHLORIDE 50 MG: 25 CAPSULE ORAL at 22:23

## 2019-10-08 RX ADMIN — OXYCODONE HYDROCHLORIDE 5 MG: 5 TABLET ORAL at 08:35

## 2019-10-08 RX ADMIN — PHENAZOPYRIDINE 200 MG: 200 TABLET ORAL at 08:36

## 2019-10-08 RX ADMIN — SODIUM CHLORIDE 125 ML/HR: 9 INJECTION, SOLUTION INTRAVENOUS at 17:24

## 2019-10-08 RX ADMIN — CEFTRIAXONE SODIUM 1 G: 1 INJECTION, SOLUTION INTRAVENOUS at 22:07

## 2019-10-08 RX ADMIN — SODIUM CHLORIDE, PRESERVATIVE FREE 10 ML: 5 INJECTION INTRAVENOUS at 20:09

## 2019-10-08 NOTE — PLAN OF CARE
Problem: Patient Care Overview  Goal: Plan of Care Review  Outcome: Ongoing (interventions implemented as appropriate)   10/08/19 0334   Coping/Psychosocial   Plan of Care Reviewed With patient   Plan of Care Review   Progress no change   OTHER   Outcome Summary a/ox 4 , ind. , ivf , iv abx , pain control , pt required O2 during sleep due to desat. 2l NC ,  straining urine , possible lithotripsy on wed , per urology, pt prefers to have today due to not having transportation to return back to the [facility] wed.   Will cont to monitor

## 2019-10-08 NOTE — PLAN OF CARE
Problem: Patient Care Overview  Goal: Plan of Care Review  Outcome: Ongoing (interventions implemented as appropriate)   10/08/19 5292   Coping/Psychosocial   Plan of Care Reviewed With patient   Plan of Care Review   Progress improving   OTHER   Outcome Summary A&O x4; up ad edith; complaining of continuous pain treated with Dilaudid q2h for severe pain; Oxy IR for moderate pain; Plan is for lithotripsy tomorrow at 12 noon. SR - ST; vitals otherwise stable.

## 2019-10-08 NOTE — PROGRESS NOTES
[facility]  Inpatient Progress Note    Reason for Follow Up:  Elevated liver enzymes    Subjective     Interval History:   She is without GI complaints.  Does c/o worsening flank pain.     Current Facility-Administered Medications:   •  cefTRIAXone (ROCEPHIN) IVPB 1 g, 1 g, Intravenous, Q24H, Lorna Albarado MD  •  diphenhydrAMINE (BENADRYL) capsule 50 mg, 50 mg, Oral, Nightly PRN, Anthony Corbin MD, 50 mg at 10/07/19 2200  •  famotidine (PEPCID) injection 20 mg, 20 mg, Intravenous, Q12H, Anthony Corbin MD, 20 mg at 10/08/19 0836  •  HYDROmorphone (DILAUDID) injection 0.5 mg, 0.5 mg, Intravenous, Q2H PRN, Lorna Albarado MD  •  HYDROmorphone (DILAUDID) injection 1 mg, 1 mg, Intravenous, Q2H PRN, Satish Sibley Jr., MD, 1 mg at 10/08/19 1319  •  ondansetron (ZOFRAN) tablet 4 mg, 4 mg, Oral, Q6H PRN **OR** ondansetron (ZOFRAN) injection 4 mg, 4 mg, Intravenous, Q6H PRN, Anthony Corbin MD, 4 mg at 10/08/19 1132  •  oxyCODONE (ROXICODONE) immediate release tablet 5 mg, 5 mg, Oral, Q4H PRN, Felipe Paez MD, 5 mg at 10/08/19 1248  •  phenazopyridine (PYRIDIUM) tablet 200 mg, 200 mg, Oral, TID PRN, Felipe Paez MD, 200 mg at 10/08/19 0836  •  [COMPLETED] Insert peripheral IV, , , Once **AND** sodium chloride 0.9 % flush 10 mL, 10 mL, Intravenous, PRN, Rolando Lemus MD, 10 mL at 10/06/19 0237  •  sodium chloride 0.9 % flush 10 mL, 10 mL, Intravenous, Q12H, Anthony Corbin MD, 10 mL at 10/08/19 0836  •  sodium chloride 0.9 % flush 10 mL, 10 mL, Intravenous, PRN, Anthony Corbin MD  •  sodium chloride 0.9 % infusion, 125 mL/hr, Intravenous, Continuous, Anthony Corbin MD, Last Rate: 125 mL/hr at 10/08/19 1008, 125 mL/hr at 10/08/19 1008  Review of Systems:    The following systems were reviewed and negative;  constitution and gastrointestinal    Objective     Vital Signs  Temp:  [97.7 °F (36.5 °C)-98.2 °F (36.8 °C)] 97.7 °F (36.5  °C)  Heart Rate:  [74-82] 75  Resp:  [18] 18  BP: (103-122)/(53-73) 103/53  Body mass index is 39.33 kg/m².    Intake/Output Summary (Last 24 hours) at 10/8/2019 1413  Last data filed at 10/8/2019 1100  Gross per 24 hour   Intake 2800 ml   Output 2485 ml   Net 315 ml     I/O this shift:  In: 1120 [P.O.:120; I.V.:1000]  Out: 300 [Urine:300]     Physical Exam:   General: patient awake, alert and cooperative   Abdomen: soft, nontender, nondistended; normal bowel sounds   Rectal: deferred   Extremities: no rash or edema   Psychiatric: Normal mood and behavior; memory intact     Results Review:     I reviewed the patient's new clinical results.    Results from last 7 days   Lab Units 10/07/19  0309 10/06/19  1716 10/05/19  1821   WBC 10*3/mm3 5.58 5.53 4.41   HEMOGLOBIN g/dL 9.9* 11.2* 13.4   HEMATOCRIT % 30.9* 34.9 41.9   PLATELETS 10*3/mm3 166 168 209     Results from last 7 days   Lab Units 10/07/19  0309 10/06/19  1606 10/06/19  0319   SODIUM mmol/L 141 140 138   POTASSIUM mmol/L 4.3 4.1 3.8   CHLORIDE mmol/L 108* 108* 104   CO2 mmol/L 23.8 22.9 18.2*   BUN mg/dL 7 8 10   CREATININE mg/dL 1.01* 0.97 1.19*   CALCIUM mg/dL 7.8* 7.6* 7.5*   BILIRUBIN mg/dL 0.5 0.8 1.1   ALK PHOS U/L 115 121* 115   ALT (SGPT) U/L 307* 356* 362*   AST (SGOT) U/L 256* 397* 422*   GLUCOSE mg/dL 89 90 104*     Results from last 7 days   Lab Units 10/07/19  0309 10/06/19  1607 10/05/19  2050   INR  0.98 1.01 0.99     Lab Results   Lab Value Date/Time    LIPASE 20 10/05/2019 1821         Assessment/Plan   Assessment:   1.   Elevated liver enzymes - hepatocellular pattern, improving  2.   Recent bactrim use  3.   Recent tylenol use  4.   Hepatic steatosis    Plan:   LFTs improving with continued normalization of INR.  There is concern about antecedent excess use of tylenol, but no indication for NAC at this point.  I also wonder if there could be an element of DILI from recent bactrim use, as she did have modest absolute eosinophilia on  admission.  Either way the treatment would appear to be supportive at this point given continued improvement in LFTs.  She will need close outpt monitoring of LFTs upon discharge.      I discussed the patients findings and my recommendations with patient.         Griffin Lorenz M.D.  [facility]  [address] [place] [address]  Office: [phone]

## 2019-10-09 ENCOUNTER — ANESTHESIA (OUTPATIENT)
Dept: PERIOP | Facility: HOSPITAL | Age: 49
End: 2019-10-09

## 2019-10-09 ENCOUNTER — APPOINTMENT (OUTPATIENT)
Dept: GENERAL RADIOLOGY | Facility: HOSPITAL | Age: 49
End: 2019-10-09

## 2019-10-09 ENCOUNTER — ANESTHESIA EVENT (OUTPATIENT)
Dept: PERIOP | Facility: HOSPITAL | Age: 49
End: 2019-10-09

## 2019-10-09 LAB
ALBUMIN SERPL-MCNC: 3.4 G/DL (ref 3.5–5.2)
ALBUMIN SERPL-MCNC: 3.4 G/DL (ref 3.5–5.2)
ALBUMIN/GLOB SERPL: 1.3 G/DL
ALP SERPL-CCNC: 114 U/L (ref 39–117)
ALP SERPL-CCNC: 117 U/L (ref 39–117)
ALT SERPL W P-5'-P-CCNC: 188 U/L (ref 1–33)
ALT SERPL W P-5'-P-CCNC: 189 U/L (ref 1–33)
ANION GAP SERPL CALCULATED.3IONS-SCNC: 10.4 MMOL/L (ref 5–15)
ANION GAP SERPL CALCULATED.3IONS-SCNC: 13 MMOL/L (ref 5–15)
AST SERPL-CCNC: 84 U/L (ref 1–32)
AST SERPL-CCNC: 84 U/L (ref 1–32)
BILIRUB CONJ SERPL-MCNC: 0.2 MG/DL (ref 0.2–0.3)
BILIRUB INDIRECT SERPL-MCNC: 0.2 MG/DL
BILIRUB SERPL-MCNC: 0.3 MG/DL (ref 0.2–1.2)
BILIRUB SERPL-MCNC: 0.4 MG/DL (ref 0.2–1.2)
BUN BLD-MCNC: 4 MG/DL (ref 6–20)
BUN BLD-MCNC: 4 MG/DL (ref 6–20)
BUN/CREAT SERPL: 4.7 (ref 7–25)
BUN/CREAT SERPL: 4.8 (ref 7–25)
CALCIUM SPEC-SCNC: 8 MG/DL (ref 8.6–10.5)
CALCIUM SPEC-SCNC: 8.1 MG/DL (ref 8.6–10.5)
CHLORIDE SERPL-SCNC: 106 MMOL/L (ref 98–107)
CHLORIDE SERPL-SCNC: 107 MMOL/L (ref 98–107)
CO2 SERPL-SCNC: 24 MMOL/L (ref 22–29)
CO2 SERPL-SCNC: 25.6 MMOL/L (ref 22–29)
CREAT BLD-MCNC: 0.84 MG/DL (ref 0.57–1)
CREAT BLD-MCNC: 0.86 MG/DL (ref 0.57–1)
DEPRECATED RDW RBC AUTO: 41.8 FL (ref 37–54)
ERYTHROCYTE [DISTWIDTH] IN BLOOD BY AUTOMATED COUNT: 13.4 % (ref 12.3–15.4)
GFR SERPL CREATININE-BSD FRML MDRD: 70 ML/MIN/1.73
GFR SERPL CREATININE-BSD FRML MDRD: 72 ML/MIN/1.73
GLOBULIN UR ELPH-MCNC: 2.6 GM/DL
GLUCOSE BLD-MCNC: 85 MG/DL (ref 65–99)
GLUCOSE BLD-MCNC: 87 MG/DL (ref 65–99)
HCT VFR BLD AUTO: 31.7 % (ref 34–46.6)
HGB BLD-MCNC: 9.9 G/DL (ref 12–15.9)
MAGNESIUM SERPL-MCNC: 2.3 MG/DL (ref 1.6–2.6)
MCH RBC QN AUTO: 26.7 PG (ref 26.6–33)
MCHC RBC AUTO-ENTMCNC: 31.2 G/DL (ref 31.5–35.7)
MCV RBC AUTO: 85.4 FL (ref 79–97)
PLATELET # BLD AUTO: 208 10*3/MM3 (ref 140–450)
PMV BLD AUTO: 10.5 FL (ref 6–12)
POTASSIUM BLD-SCNC: 4.4 MMOL/L (ref 3.5–5.2)
POTASSIUM BLD-SCNC: 4.5 MMOL/L (ref 3.5–5.2)
PROT SERPL-MCNC: 6 G/DL (ref 6–8.5)
PROT SERPL-MCNC: 6.1 G/DL (ref 6–8.5)
RBC # BLD AUTO: 3.71 10*6/MM3 (ref 3.77–5.28)
SODIUM BLD-SCNC: 142 MMOL/L (ref 136–145)
SODIUM BLD-SCNC: 144 MMOL/L (ref 136–145)
WBC NRBC COR # BLD: 6.99 10*3/MM3 (ref 3.4–10.8)

## 2019-10-09 PROCEDURE — 25010000002 HYDROMORPHONE PER 4 MG: Performed by: NURSE ANESTHETIST, CERTIFIED REGISTERED

## 2019-10-09 PROCEDURE — 25010000002 CEFTRIAXONE PER 250 MG: Performed by: INTERNAL MEDICINE

## 2019-10-09 PROCEDURE — 25010000002 LEVOFLOXACIN PER 250 MG: Performed by: NURSE ANESTHETIST, CERTIFIED REGISTERED

## 2019-10-09 PROCEDURE — 80076 HEPATIC FUNCTION PANEL: CPT | Performed by: HOSPITALIST

## 2019-10-09 PROCEDURE — 25010000002 MIDAZOLAM PER 1 MG: Performed by: ANESTHESIOLOGY

## 2019-10-09 PROCEDURE — 25010000002 DEXAMETHASONE PER 1 MG: Performed by: NURSE ANESTHETIST, CERTIFIED REGISTERED

## 2019-10-09 PROCEDURE — 80048 BASIC METABOLIC PNL TOTAL CA: CPT | Performed by: INTERNAL MEDICINE

## 2019-10-09 PROCEDURE — 0TJ98ZZ INSPECTION OF URETER, VIA NATURAL OR ARTIFICIAL OPENING ENDOSCOPIC: ICD-10-PCS | Performed by: UROLOGY

## 2019-10-09 PROCEDURE — 25010000002 KETOROLAC TROMETHAMINE PER 15 MG: Performed by: NURSE ANESTHETIST, CERTIFIED REGISTERED

## 2019-10-09 PROCEDURE — 82360 CALCULUS ASSAY QUANT: CPT | Performed by: UROLOGY

## 2019-10-09 PROCEDURE — 25010000002 HYDROMORPHONE PER 4 MG: Performed by: HOSPITALIST

## 2019-10-09 PROCEDURE — 25010000002 SUCCINYLCHOLINE PER 20 MG: Performed by: NURSE ANESTHETIST, CERTIFIED REGISTERED

## 2019-10-09 PROCEDURE — 25010000002 FENTANYL CITRATE (PF) 100 MCG/2ML SOLUTION: Performed by: NURSE ANESTHETIST, CERTIFIED REGISTERED

## 2019-10-09 PROCEDURE — 25010000002 PROPOFOL 10 MG/ML EMULSION: Performed by: NURSE ANESTHETIST, CERTIFIED REGISTERED

## 2019-10-09 PROCEDURE — 83735 ASSAY OF MAGNESIUM: CPT | Performed by: INTERNAL MEDICINE

## 2019-10-09 PROCEDURE — 25010000002 HYDROMORPHONE 1 MG/ML SOLUTION: Performed by: UROLOGY

## 2019-10-09 PROCEDURE — 99232 SBSQ HOSP IP/OBS MODERATE 35: CPT | Performed by: INTERNAL MEDICINE

## 2019-10-09 PROCEDURE — 25010000002 FENTANYL CITRATE (PF) 100 MCG/2ML SOLUTION: Performed by: ANESTHESIOLOGY

## 2019-10-09 PROCEDURE — 25010000002 ONDANSETRON PER 1 MG: Performed by: HOSPITALIST

## 2019-10-09 PROCEDURE — 63710000001 DIPHENHYDRAMINE PER 50 MG: Performed by: HOSPITALIST

## 2019-10-09 PROCEDURE — 85027 COMPLETE CBC AUTOMATED: CPT | Performed by: INTERNAL MEDICINE

## 2019-10-09 PROCEDURE — 80053 COMPREHEN METABOLIC PANEL: CPT | Performed by: INTERNAL MEDICINE

## 2019-10-09 PROCEDURE — 25010000002 ONDANSETRON PER 1 MG: Performed by: NURSE ANESTHETIST, CERTIFIED REGISTERED

## 2019-10-09 RX ORDER — PROMETHAZINE HYDROCHLORIDE 25 MG/1
25 SUPPOSITORY RECTAL ONCE AS NEEDED
Status: DISCONTINUED | OUTPATIENT
Start: 2019-10-09 | End: 2019-10-09 | Stop reason: HOSPADM

## 2019-10-09 RX ORDER — LIDOCAINE HYDROCHLORIDE 20 MG/ML
INJECTION, SOLUTION INFILTRATION; PERINEURAL AS NEEDED
Status: DISCONTINUED | OUTPATIENT
Start: 2019-10-09 | End: 2019-10-09 | Stop reason: SURG

## 2019-10-09 RX ORDER — KETOROLAC TROMETHAMINE 30 MG/ML
INJECTION, SOLUTION INTRAMUSCULAR; INTRAVENOUS AS NEEDED
Status: DISCONTINUED | OUTPATIENT
Start: 2019-10-09 | End: 2019-10-09 | Stop reason: SURG

## 2019-10-09 RX ORDER — ACETAMINOPHEN 325 MG/1
650 TABLET ORAL ONCE AS NEEDED
Status: DISCONTINUED | OUTPATIENT
Start: 2019-10-09 | End: 2019-10-09 | Stop reason: HOSPADM

## 2019-10-09 RX ORDER — PROMETHAZINE HYDROCHLORIDE 25 MG/1
25 TABLET ORAL ONCE AS NEEDED
Status: DISCONTINUED | OUTPATIENT
Start: 2019-10-09 | End: 2019-10-09 | Stop reason: HOSPADM

## 2019-10-09 RX ORDER — LIDOCAINE HYDROCHLORIDE 10 MG/ML
0.5 INJECTION, SOLUTION EPIDURAL; INFILTRATION; INTRACAUDAL; PERINEURAL ONCE AS NEEDED
Status: DISCONTINUED | OUTPATIENT
Start: 2019-10-09 | End: 2019-10-09 | Stop reason: HOSPADM

## 2019-10-09 RX ORDER — HYDROMORPHONE HYDROCHLORIDE 1 MG/ML
0.5 INJECTION, SOLUTION INTRAMUSCULAR; INTRAVENOUS; SUBCUTANEOUS
Status: DISCONTINUED | OUTPATIENT
Start: 2019-10-09 | End: 2019-10-09 | Stop reason: HOSPADM

## 2019-10-09 RX ORDER — OXYCODONE AND ACETAMINOPHEN 7.5; 325 MG/1; MG/1
1 TABLET ORAL ONCE AS NEEDED
Status: DISCONTINUED | OUTPATIENT
Start: 2019-10-09 | End: 2019-10-09 | Stop reason: HOSPADM

## 2019-10-09 RX ORDER — DIPHENHYDRAMINE HYDROCHLORIDE 50 MG/ML
12.5 INJECTION INTRAMUSCULAR; INTRAVENOUS
Status: DISCONTINUED | OUTPATIENT
Start: 2019-10-09 | End: 2019-10-09 | Stop reason: HOSPADM

## 2019-10-09 RX ORDER — HYDROCODONE BITARTRATE AND ACETAMINOPHEN 7.5; 325 MG/1; MG/1
1 TABLET ORAL ONCE AS NEEDED
Status: DISCONTINUED | OUTPATIENT
Start: 2019-10-09 | End: 2019-10-09 | Stop reason: HOSPADM

## 2019-10-09 RX ORDER — FENTANYL CITRATE 50 UG/ML
INJECTION, SOLUTION INTRAMUSCULAR; INTRAVENOUS AS NEEDED
Status: DISCONTINUED | OUTPATIENT
Start: 2019-10-09 | End: 2019-10-09 | Stop reason: SURG

## 2019-10-09 RX ORDER — FAMOTIDINE 10 MG/ML
20 INJECTION, SOLUTION INTRAVENOUS ONCE
Status: COMPLETED | OUTPATIENT
Start: 2019-10-09 | End: 2019-10-09

## 2019-10-09 RX ORDER — FLUMAZENIL 0.1 MG/ML
0.2 INJECTION INTRAVENOUS AS NEEDED
Status: DISCONTINUED | OUTPATIENT
Start: 2019-10-09 | End: 2019-10-09 | Stop reason: HOSPADM

## 2019-10-09 RX ORDER — HYDROMORPHONE HYDROCHLORIDE 1 MG/ML
0.5 INJECTION, SOLUTION INTRAMUSCULAR; INTRAVENOUS; SUBCUTANEOUS
Status: DISCONTINUED | OUTPATIENT
Start: 2019-10-09 | End: 2019-10-10 | Stop reason: HOSPADM

## 2019-10-09 RX ORDER — EPHEDRINE SULFATE 50 MG/ML
5 INJECTION, SOLUTION INTRAVENOUS ONCE AS NEEDED
Status: DISCONTINUED | OUTPATIENT
Start: 2019-10-09 | End: 2019-10-09 | Stop reason: HOSPADM

## 2019-10-09 RX ORDER — LEVOFLOXACIN 5 MG/ML
INJECTION, SOLUTION INTRAVENOUS AS NEEDED
Status: DISCONTINUED | OUTPATIENT
Start: 2019-10-09 | End: 2019-10-09 | Stop reason: SURG

## 2019-10-09 RX ORDER — SODIUM CHLORIDE 0.9 % (FLUSH) 0.9 %
3-10 SYRINGE (ML) INJECTION AS NEEDED
Status: DISCONTINUED | OUTPATIENT
Start: 2019-10-09 | End: 2019-10-09 | Stop reason: HOSPADM

## 2019-10-09 RX ORDER — FENTANYL CITRATE 50 UG/ML
50 INJECTION, SOLUTION INTRAMUSCULAR; INTRAVENOUS
Status: DISCONTINUED | OUTPATIENT
Start: 2019-10-09 | End: 2019-10-09 | Stop reason: HOSPADM

## 2019-10-09 RX ORDER — PROPOFOL 10 MG/ML
VIAL (ML) INTRAVENOUS AS NEEDED
Status: DISCONTINUED | OUTPATIENT
Start: 2019-10-09 | End: 2019-10-09 | Stop reason: SURG

## 2019-10-09 RX ORDER — SUCCINYLCHOLINE CHLORIDE 20 MG/ML
INJECTION INTRAMUSCULAR; INTRAVENOUS AS NEEDED
Status: DISCONTINUED | OUTPATIENT
Start: 2019-10-09 | End: 2019-10-09 | Stop reason: SURG

## 2019-10-09 RX ORDER — MIDAZOLAM HYDROCHLORIDE 1 MG/ML
1 INJECTION INTRAMUSCULAR; INTRAVENOUS
Status: DISCONTINUED | OUTPATIENT
Start: 2019-10-09 | End: 2019-10-09 | Stop reason: HOSPADM

## 2019-10-09 RX ORDER — DIPHENHYDRAMINE HCL 25 MG
25 CAPSULE ORAL
Status: DISCONTINUED | OUTPATIENT
Start: 2019-10-09 | End: 2019-10-09 | Stop reason: HOSPADM

## 2019-10-09 RX ORDER — PROMETHAZINE HYDROCHLORIDE 25 MG/ML
12.5 INJECTION, SOLUTION INTRAMUSCULAR; INTRAVENOUS ONCE AS NEEDED
Status: DISCONTINUED | OUTPATIENT
Start: 2019-10-09 | End: 2019-10-09 | Stop reason: HOSPADM

## 2019-10-09 RX ORDER — HYDRALAZINE HYDROCHLORIDE 20 MG/ML
5 INJECTION INTRAMUSCULAR; INTRAVENOUS
Status: DISCONTINUED | OUTPATIENT
Start: 2019-10-09 | End: 2019-10-09 | Stop reason: HOSPADM

## 2019-10-09 RX ORDER — MIDAZOLAM HYDROCHLORIDE 1 MG/ML
2 INJECTION INTRAMUSCULAR; INTRAVENOUS
Status: DISCONTINUED | OUTPATIENT
Start: 2019-10-09 | End: 2019-10-09 | Stop reason: HOSPADM

## 2019-10-09 RX ORDER — SODIUM CHLORIDE, SODIUM LACTATE, POTASSIUM CHLORIDE, CALCIUM CHLORIDE 600; 310; 30; 20 MG/100ML; MG/100ML; MG/100ML; MG/100ML
9 INJECTION, SOLUTION INTRAVENOUS CONTINUOUS
Status: DISCONTINUED | OUTPATIENT
Start: 2019-10-09 | End: 2019-10-09

## 2019-10-09 RX ORDER — SCOLOPAMINE TRANSDERMAL SYSTEM 1 MG/1
1 PATCH, EXTENDED RELEASE TRANSDERMAL
Status: DISCONTINUED | OUTPATIENT
Start: 2019-10-09 | End: 2019-10-10 | Stop reason: HOSPADM

## 2019-10-09 RX ORDER — DEXAMETHASONE SODIUM PHOSPHATE 4 MG/ML
INJECTION, SOLUTION INTRA-ARTICULAR; INTRALESIONAL; INTRAMUSCULAR; INTRAVENOUS; SOFT TISSUE AS NEEDED
Status: DISCONTINUED | OUTPATIENT
Start: 2019-10-09 | End: 2019-10-09 | Stop reason: SURG

## 2019-10-09 RX ORDER — NALOXONE HCL 0.4 MG/ML
0.2 VIAL (ML) INJECTION AS NEEDED
Status: DISCONTINUED | OUTPATIENT
Start: 2019-10-09 | End: 2019-10-09 | Stop reason: HOSPADM

## 2019-10-09 RX ORDER — PROMETHAZINE HYDROCHLORIDE 25 MG/ML
6.25 INJECTION, SOLUTION INTRAMUSCULAR; INTRAVENOUS
Status: DISCONTINUED | OUTPATIENT
Start: 2019-10-09 | End: 2019-10-09 | Stop reason: HOSPADM

## 2019-10-09 RX ORDER — MAGNESIUM HYDROXIDE 1200 MG/15ML
LIQUID ORAL AS NEEDED
Status: DISCONTINUED | OUTPATIENT
Start: 2019-10-09 | End: 2019-10-10 | Stop reason: HOSPADM

## 2019-10-09 RX ORDER — LABETALOL HYDROCHLORIDE 5 MG/ML
5 INJECTION, SOLUTION INTRAVENOUS
Status: DISCONTINUED | OUTPATIENT
Start: 2019-10-09 | End: 2019-10-09 | Stop reason: HOSPADM

## 2019-10-09 RX ORDER — ONDANSETRON 2 MG/ML
4 INJECTION INTRAMUSCULAR; INTRAVENOUS ONCE AS NEEDED
Status: DISCONTINUED | OUTPATIENT
Start: 2019-10-09 | End: 2019-10-09 | Stop reason: HOSPADM

## 2019-10-09 RX ORDER — ONDANSETRON 2 MG/ML
INJECTION INTRAMUSCULAR; INTRAVENOUS AS NEEDED
Status: DISCONTINUED | OUTPATIENT
Start: 2019-10-09 | End: 2019-10-09 | Stop reason: SURG

## 2019-10-09 RX ORDER — SODIUM CHLORIDE 0.9 % (FLUSH) 0.9 %
3 SYRINGE (ML) INJECTION EVERY 12 HOURS SCHEDULED
Status: DISCONTINUED | OUTPATIENT
Start: 2019-10-09 | End: 2019-10-09 | Stop reason: HOSPADM

## 2019-10-09 RX ADMIN — FENTANYL CITRATE 50 MCG: 50 INJECTION, SOLUTION INTRAMUSCULAR; INTRAVENOUS at 11:10

## 2019-10-09 RX ADMIN — DEXAMETHASONE SODIUM PHOSPHATE 8 MG: 4 INJECTION INTRA-ARTICULAR; INTRALESIONAL; INTRAMUSCULAR; INTRAVENOUS; SOFT TISSUE at 12:50

## 2019-10-09 RX ADMIN — ONDANSETRON 4 MG: 2 INJECTION INTRAMUSCULAR; INTRAVENOUS at 16:30

## 2019-10-09 RX ADMIN — ONDANSETRON 4 MG: 2 INJECTION INTRAMUSCULAR; INTRAVENOUS at 12:56

## 2019-10-09 RX ADMIN — HYDROMORPHONE HYDROCHLORIDE 1 MG: 1 INJECTION, SOLUTION INTRAMUSCULAR; INTRAVENOUS; SUBCUTANEOUS at 06:18

## 2019-10-09 RX ADMIN — FAMOTIDINE 20 MG: 10 INJECTION INTRAVENOUS at 08:34

## 2019-10-09 RX ADMIN — SODIUM CHLORIDE, POTASSIUM CHLORIDE, SODIUM LACTATE AND CALCIUM CHLORIDE 9 ML/HR: 600; 310; 30; 20 INJECTION, SOLUTION INTRAVENOUS at 11:08

## 2019-10-09 RX ADMIN — LEVOFLOXACIN 500 MG: 5 INJECTION, SOLUTION INTRAVENOUS at 12:56

## 2019-10-09 RX ADMIN — OXYCODONE HYDROCHLORIDE 5 MG: 5 TABLET ORAL at 22:54

## 2019-10-09 RX ADMIN — HYDROMORPHONE HYDROCHLORIDE 0.5 MG: 1 INJECTION, SOLUTION INTRAMUSCULAR; INTRAVENOUS; SUBCUTANEOUS at 20:20

## 2019-10-09 RX ADMIN — CEFTRIAXONE SODIUM 1 G: 1 INJECTION, SOLUTION INTRAVENOUS at 22:55

## 2019-10-09 RX ADMIN — FENTANYL CITRATE 50 MCG: 50 INJECTION, SOLUTION INTRAMUSCULAR; INTRAVENOUS at 13:21

## 2019-10-09 RX ADMIN — FENTANYL CITRATE 100 MCG: 50 INJECTION INTRAMUSCULAR; INTRAVENOUS at 12:27

## 2019-10-09 RX ADMIN — FENTANYL CITRATE 50 MCG: 50 INJECTION, SOLUTION INTRAMUSCULAR; INTRAVENOUS at 13:30

## 2019-10-09 RX ADMIN — HYDROMORPHONE HYDROCHLORIDE 1 MG: 1 INJECTION, SOLUTION INTRAMUSCULAR; INTRAVENOUS; SUBCUTANEOUS at 02:09

## 2019-10-09 RX ADMIN — OXYCODONE HYDROCHLORIDE 5 MG: 5 TABLET ORAL at 04:19

## 2019-10-09 RX ADMIN — PROPOFOL 150 MG: 10 INJECTION, EMULSION INTRAVENOUS at 12:32

## 2019-10-09 RX ADMIN — KETOROLAC TROMETHAMINE 30 MG: 30 INJECTION, SOLUTION INTRAMUSCULAR; INTRAVENOUS at 12:56

## 2019-10-09 RX ADMIN — SODIUM CHLORIDE, PRESERVATIVE FREE 10 ML: 5 INJECTION INTRAVENOUS at 20:21

## 2019-10-09 RX ADMIN — DIPHENHYDRAMINE HYDROCHLORIDE 50 MG: 25 CAPSULE ORAL at 22:55

## 2019-10-09 RX ADMIN — OXYCODONE HYDROCHLORIDE 5 MG: 5 TABLET ORAL at 00:09

## 2019-10-09 RX ADMIN — SODIUM CHLORIDE 125 ML/HR: 9 INJECTION, SOLUTION INTRAVENOUS at 10:11

## 2019-10-09 RX ADMIN — LIDOCAINE HYDROCHLORIDE 100 MG: 20 INJECTION, SOLUTION INFILTRATION; PERINEURAL at 12:32

## 2019-10-09 RX ADMIN — HYDROMORPHONE HYDROCHLORIDE 0.5 MG: 1 INJECTION, SOLUTION INTRAMUSCULAR; INTRAVENOUS; SUBCUTANEOUS at 14:12

## 2019-10-09 RX ADMIN — FAMOTIDINE 20 MG: 10 INJECTION INTRAVENOUS at 11:10

## 2019-10-09 RX ADMIN — MIDAZOLAM 1 MG: 1 INJECTION INTRAMUSCULAR; INTRAVENOUS at 11:10

## 2019-10-09 RX ADMIN — SCOPOLAMINE 1 PATCH: 1 PATCH TRANSDERMAL at 11:10

## 2019-10-09 RX ADMIN — SODIUM CHLORIDE, PRESERVATIVE FREE 10 ML: 5 INJECTION INTRAVENOUS at 08:35

## 2019-10-09 RX ADMIN — HYDROMORPHONE HYDROCHLORIDE 1 MG: 1 INJECTION, SOLUTION INTRAMUSCULAR; INTRAVENOUS; SUBCUTANEOUS at 08:35

## 2019-10-09 RX ADMIN — HYDROMORPHONE HYDROCHLORIDE 0.5 MG: 1 INJECTION, SOLUTION INTRAMUSCULAR; INTRAVENOUS; SUBCUTANEOUS at 13:44

## 2019-10-09 RX ADMIN — PHENAZOPYRIDINE 200 MG: 200 TABLET ORAL at 14:12

## 2019-10-09 RX ADMIN — SUCCINYLCHOLINE CHLORIDE 200 MG: 20 INJECTION, SOLUTION INTRAMUSCULAR; INTRAVENOUS; PARENTERAL at 12:32

## 2019-10-09 NOTE — PROGRESS NOTES
[facility]     LOS: 4 days     Name: Val Pettit  Age: 49 y.o.  Sex: female  :  1970  MRN: [id number]         Primary Care Physician: Xu Kyle MD    NPO Diet    Subjective   Still with some right-sided flank pain positional but otherwise feels okay.    Review of Systems   Respiratory: Negative for shortness of breath.    Cardiovascular: Negative for chest pain.   Genitourinary: Positive for flank pain.     Objective   Temp:  [97.4 °F (36.3 °C)-97.8 °F (36.6 °C)] 97.4 °F (36.3 °C)  Heart Rate:  [65-82] 65  Resp:  [18] 18  BP: (103-117)/(53-74) 114/74  SpO2:  [84 %-97 %] 91 %  on  Flow (L/min):  [2] 2;   Device (Oxygen Therapy): nasal cannula  Body mass index is 39.33 kg/m².    Physical Exam   Constitutional: She is oriented to person, place, and time. No distress.   Cardiovascular: Normal rate and regular rhythm.   Pulmonary/Chest: Effort normal and breath sounds normal. No respiratory distress.   Abdominal: Soft. Bowel sounds are normal. There is no tenderness. There is no rebound and no guarding.   Musculoskeletal: She exhibits no edema.   Neurological: She is alert and oriented to person, place, and time.   Skin: Skin is warm and dry.   Psychiatric: She has a normal mood and affect. Her behavior is normal.   Nursing note and vitals reviewed.    Reviewed medications and new clinical results    Scheduled Meds  ceftriaxone 1 g Intravenous Q24H   famotidine 20 mg Intravenous Q12H   polyethylene glycol 17 g Oral Daily   sodium chloride 10 mL Intravenous Q12H     Continuous Infusions  sodium chloride 125 mL/hr Last Rate: 125 mL/hr (10/08/19 1724)     PRN Meds  diphenhydrAMINE  •  HYDROmorphone  •  ondansetron **OR** ondansetron  •  oxyCODONE  •  phenazopyridine  •  [COMPLETED] Insert peripheral IV **AND** sodium chloride  •  sodium chloride    Results from last 7 days   Lab Units 10/09/19  0323 10/07/19  0309 10/06/19  1716 10/05/19  1821    WBC 10*3/mm3 6.99 5.58 5.53 4.41   HEMOGLOBIN g/dL 9.9* 9.9* 11.2* 13.4   PLATELETS 10*3/mm3 208 166 168 209     Results from last 7 days   Lab Units 10/09/19  0323 10/07/19  0309 10/06/19  1606 10/06/19  0319 10/05/19  1821   SODIUM mmol/L 142 141 140 138 137   POTASSIUM mmol/L 4.4 4.3 4.1 3.8 4.0   CHLORIDE mmol/L 106 108* 108* 104 102   CO2 mmol/L 25.6 23.8 22.9 18.2* 20.2*   BUN mg/dL 4* 7 8 10 8   CREATININE mg/dL 0.86 1.01* 0.97 1.19* 1.29*   CALCIUM mg/dL 8.0* 7.8* 7.6* 7.5* 8.9   GLUCOSE mg/dL 87 89 90 104* 108*     Lab Results   Component Value Date    ANIONGAP 10.4 10/09/2019     Results from last 7 days   Lab Units 10/07/19  0309 10/06/19  1606 10/06/19  0319   ALK PHOS U/L 115 121* 115   BILIRUBIN mg/dL 0.5 0.8 1.1   ALT (SGPT) U/L 307* 356* 362*   AST (SGOT) U/L 256* 397* 422*     Estimated Creatinine Clearance: 95.9 mL/min (by C-G formula based on SCr of 0.86 mg/dL).    I personally reviewed CT abd/pelvis    Assessment/Plan   Active Hospital Problems    Diagnosis  POA   • **Hepatitis [K75.9]  Unknown   • LFT elevation [R94.5]  Unknown   • UTI (urinary tract infection), bacterial [N39.0, A49.9]  Unknown   • Hypotension [I95.9]  Unknown   • Tachycardia [R00.0]  Unknown   • Left upper quadrant pain [R10.12]  Yes      Resolved Hospital Problems   No resolved problems to display.     49 y.o. female     · Hepatitis: LFTs improving.  Continue supportive care and will need close follow-up monitoring LFTs at discharge.  Will add on to existing specimen in lab for today  · UTI right ureterolithiasis: Intervention today by urology.  Continue analgesia, to reduce dose of IV narcotic as needed.  She is on ceftriaxone  · Anemia: Monitor drop in hemoglobin suspect dilution  · disposition to be determined  · discussed with patient and nursing staff.    Kody Kenney MD   10/09/19  9:13 AM

## 2019-10-09 NOTE — ANESTHESIA PREPROCEDURE EVALUATION
Anesthesia Evaluation     Patient summary reviewed and Nursing notes reviewed   history of anesthetic complications: PONV  NPO Solid Status: > 8 hours  NPO Liquid Status: > 2 hours           Airway   Mallampati: III  TM distance: >3 FB  Neck ROM: full  no difficulty expected  Dental - normal exam     Pulmonary - negative pulmonary ROS and normal exam    breath sounds clear to auscultation  (-) decreased breath sounds, wheezes  Cardiovascular - normal exam  Exercise tolerance: good (4-7 METS)    Rhythm: regular  Rate: normal    (-) hypertension      Neuro/Psych- negative ROS  (-) seizures, CVA  GI/Hepatic/Renal/Endo    (+) obesity,  GERD,    (-) diabetes    Musculoskeletal     Abdominal  - normal exam   Substance History - negative use  (-) alcohol use, drug use     OB/GYN negative ob/gyn ROS         Other   (+) arthritis                     Anesthesia Plan    ASA 3     general     intravenous induction   Anesthetic plan, all risks, benefits, and alternatives have been provided, discussed and informed consent has been obtained with: patient.    Plan discussed with CRNA.

## 2019-10-09 NOTE — SIGNIFICANT NOTE
Informed spouse that pt is feeling a lot better.  She has been up to the bathroom and voided.  She is now ready to return to her room

## 2019-10-09 NOTE — PERIOPERATIVE NURSING NOTE
DR LANG HERE @ BEDSIDE, AND OBSERVED STONE THAT PATIENT PASSED JUST PRIOR TO PATIENT COMING DOWN TO HOLDING. Pt. AND MD DISCUSSED OPTIONS, AND DECIDED TO PROCEED WITH PROCEDURE, BUT DECIDED TO POSSIBLY PLACE A URETERAL STENT ON RIGHT. Pt.'S  INITIALED CONSENT.

## 2019-10-09 NOTE — ADDENDUM NOTE
Addendum  created 10/09/19 1401 by Yessi Heller, NICA    Child order released for a procedure order, Intraprocedure Blocks edited, LDA created via procedure documentation, Sign clinical note

## 2019-10-09 NOTE — OP NOTE
Operative Report    [facility] OR    Patient: Val Pettit  Age:      49 y.o.  :     1970  Sex:      female    Medical Record:  [id number]    Date of Operation/Procedure:  10/9/2019    Pre-op Diagnosis:   Right ureteral stone    Post-Op Diagnosis Codes:   Same    Pre-operative Diagnosis Free Text:  * No pre-op diagnosis entered *     Name of Operation/Procedure:  Procedure(s) and Anesthesia Type:     * RIGHT URETEROSCOPY - General    Findings/Complications:  No stone identified    Description of procedure: The patient was taken to the OR and placed under GA in lithotomy position.  Prepped and draped in sterile fashion.  The 21 Fr cystoscope was introduced and pan-cystoscopy was performed.  No tumors or stones were seen.  A Sensor guidewire was passed through the right ureteral orifice into the kidney under fluoro guidance without difficulty. Next, a rigid ureteroscope was passed alongside the safety wire. No stone was seen in the right ureter, to the level of the UPJ. The scope was slowly removed, again finding no residual stones. The wire was removed, and the procedure was terminated.    Estimated Blood Loss: minimal    Specimens:   Order Name Source Comment Collection Info Order Time   STONE ANALYSIS Ureter, Right  Collected By: Satish Sibley Jr., MD 10/9/2019 12:38 PM       Fluids/Drains: none    Satish Sibley Jr., MD  10/9/2019  12:50 PM

## 2019-10-09 NOTE — ANESTHESIA POSTPROCEDURE EVALUATION
Patient: Val Pettit    Procedure Summary     Date:  10/09/19 Room / Location:  [facility] OR 01 / [facility] MAIN OR    Anesthesia Start:  1220 Anesthesia Stop:  1318    Procedure:  RIGHT URETEROSCOPY (Right ) Diagnosis:      Surgeon:  Satish Sibley Jr., MD Provider:  Ángel Reed MD    Anesthesia Type:  general ASA Status:  3          Anesthesia Type: general  Last vitals  BP   138/77 (10/09/19 1320)   Temp   36.6 °C (97.8 °F) (10/09/19 1312)   Pulse   81 (10/09/19 1320)   Resp   16 (10/09/19 1320)     SpO2   99 % (10/09/19 1320)     Post Anesthesia Care and Evaluation    Patient location during evaluation: PACU  Patient participation: complete - patient participated  Level of consciousness: awake and alert  Pain management: adequate  Airway patency: patent  Anesthetic complications: No anesthetic complications    Cardiovascular status: acceptable  Respiratory status: acceptable  Hydration status: acceptable    Comments: --------------------            10/09/19               1320     --------------------   BP:       138/77     Pulse:      81       Resp:       16       Temp:                SpO2:      99%      --------------------

## 2019-10-09 NOTE — ANESTHESIA PROCEDURE NOTES
Airway  Urgency: elective    Date/Time: 10/9/2019 12:33 PM    General Information and Staff    Patient location during procedure: OR  Anesthesiologist: Satish Yeung MD  CRNA: Yessi Heller CRNA    Indications and Patient Condition  Indications for airway management: airway protection    Preoxygenated: yes  Mask difficulty assessment: 1 - vent by mask    Final Airway Details  Final airway type: endotracheal airway      Successful airway: ETT  Cuffed: yes   Successful intubation technique: direct laryngoscopy  Facilitating devices/methods: intubating stylet  Blade: CMAC  Blade size: D  ETT size (mm): 7.0  Cormack-Lehane Classification: grade I - full view of glottis  Placement verified by: chest auscultation and capnometry   Cuff volume (mL): 4  Measured from: lips  ETT/EBT  to lips (cm): 19  Number of attempts at approach: 1  Assessment: lips, teeth, and gum same as pre-op and atraumatic intubation    Additional Comments  Cmac used because unable to get pt in sniffing position with the OR table, and small mouth and large tongue

## 2019-10-09 NOTE — PROGRESS NOTES
[facility]  Inpatient Progress Note    Reason for Follow Up:  Elevated liver enzymes    Subjective     Interval History:   Complains of kidney pain today.  Plans for stone removal later this morning.    Current Facility-Administered Medications:   •  cefTRIAXone (ROCEPHIN) IVPB 1 g, 1 g, Intravenous, Q24H, Lorna Albarado MD, Last Rate: 100 mL/hr at 10/08/19 2207, 1 g at 10/08/19 2207  •  diphenhydrAMINE (BENADRYL) capsule 50 mg, 50 mg, Oral, Nightly PRN, Anthony Corbin MD, 50 mg at 10/08/19 2223  •  famotidine (PEPCID) injection 20 mg, 20 mg, Intravenous, Q12H, Anthony Corbin MD, 20 mg at 10/09/19 0834  •  HYDROmorphone (DILAUDID) injection 1 mg, 1 mg, Intravenous, Q2H PRN, Satish Sibley Jr., MD, 1 mg at 10/09/19 0835  •  ondansetron (ZOFRAN) tablet 4 mg, 4 mg, Oral, Q6H PRN **OR** ondansetron (ZOFRAN) injection 4 mg, 4 mg, Intravenous, Q6H PRN, Anthony Corbin MD, 4 mg at 10/08/19 2306  •  oxyCODONE (ROXICODONE) immediate release tablet 5 mg, 5 mg, Oral, Q4H PRN, Felipe Paez MD, 5 mg at 10/09/19 0419  •  phenazopyridine (PYRIDIUM) tablet 200 mg, 200 mg, Oral, TID PRN, Felipe Paez MD, 200 mg at 10/08/19 1711  •  polyethylene glycol 3350 powder (packet), 17 g, Oral, Daily, Lorna Albarado MD, Stopped at 10/09/19 0834  •  [COMPLETED] Insert peripheral IV, , , Once **AND** sodium chloride 0.9 % flush 10 mL, 10 mL, Intravenous, PRN, Rolando Lemus MD, 10 mL at 10/06/19 0237  •  sodium chloride 0.9 % flush 10 mL, 10 mL, Intravenous, Q12H, Anthony Corbin MD, 10 mL at 10/09/19 0835  •  sodium chloride 0.9 % flush 10 mL, 10 mL, Intravenous, PRN, Anthony Corbin MD  •  sodium chloride 0.9 % infusion, 125 mL/hr, Intravenous, Continuous, Anthony Corbin MD, Last Rate: 125 mL/hr at 10/08/19 1724, 125 mL/hr at 10/08/19 1724  Review of Systems:   All systems reviewed and negative except for: : Kidney pain    Objective     Vital  Signs  Temp:  [97.4 °F (36.3 °C)-97.8 °F (36.6 °C)] 97.4 °F (36.3 °C)  Heart Rate:  [65-82] 65  Resp:  [18] 18  BP: (103-117)/(53-74) 114/74  Body mass index is 39.33 kg/m².    Intake/Output Summary (Last 24 hours) at 10/9/2019 0906  Last data filed at 10/9/2019 0714  Gross per 24 hour   Intake 2480 ml   Output 2100 ml   Net 380 ml     I/O this shift:  In: -   Out: 350 [Urine:350]     Physical Exam:   General: patient awake, alert and cooperative, in mild discomfort   Eyes: Normal lids and lashes, no scleral icterus   Neck: supple, normal ROM   Skin: warm and dry, not jaundiced   Cardiovascular: regular rhythm and rate, no murmurs auscultated   Pulm: clear to auscultation bilaterally, regular and unlabored   Abdomen: soft, obese, nontender, nondistended; normal bowel sounds   Rectal: deferred   Extremities: no rash or edema   Psychiatric: Normal mood and behavior; memory intact     Results Review:     I reviewed the patient's new clinical results.    Results from last 7 days   Lab Units 10/09/19  0323 10/07/19  0309 10/06/19  1716   WBC 10*3/mm3 6.99 5.58 5.53   HEMOGLOBIN g/dL 9.9* 9.9* 11.2*   HEMATOCRIT % 31.7* 30.9* 34.9   PLATELETS 10*3/mm3 208 166 168     Results from last 7 days   Lab Units 10/09/19  0323 10/07/19  0309 10/06/19  1606 10/06/19  0319   SODIUM mmol/L 142 141 140 138   POTASSIUM mmol/L 4.4 4.3 4.1 3.8   CHLORIDE mmol/L 106 108* 108* 104   CO2 mmol/L 25.6 23.8 22.9 18.2*   BUN mg/dL 4* 7 8 10   CREATININE mg/dL 0.86 1.01* 0.97 1.19*   CALCIUM mg/dL 8.0* 7.8* 7.6* 7.5*   BILIRUBIN mg/dL  --  0.5 0.8 1.1   ALK PHOS U/L  --  115 121* 115   ALT (SGPT) U/L  --  307* 356* 362*   AST (SGOT) U/L  --  256* 397* 422*   GLUCOSE mg/dL 87 89 90 104*     Results from last 7 days   Lab Units 10/07/19  0309 10/06/19  1607 10/05/19  2050   INR  0.98 1.01 0.99     Lab Results   Lab Value Date/Time    LIPASE 20 10/05/2019 1821       Radiology:  CT Abdomen Pelvis Without Contrast   Final Result   1.  Fatty liver  with 14 mm low-density right hepatic lobe lesion.   Follow-up recommended.   2. 2.9 cm parapelvic left renal cyst.   3. Mild diverticulosis.   4. Probable collection of tiny stones in the distal right ureter as   discussed. No significant hydronephrosis                   This report was finalized on 10/5/2019 11:00 PM by Felipe Jacobsen M.D.              Assessment/Plan     Patient Active Problem List   Diagnosis   • Left upper quadrant pain   • Hepatitis   • LFT elevation   • UTI (urinary tract infection), bacterial   • Hypotension   • Tachycardia       Impression  1. Elevated liver enzymes: had been improving, no labs yesterday or this morning    2. Recent bactrim and tylenol use: possible culprit to #1    3. R liver lobe lesion: 14mm, unclear etiology    4. Hepatic steatosis    Plan  Add CMP to this morning's labs  Daily CMP  She is going to need repeat imaging to further assess this liver lesion; recommend MRI in the next few weeks    I discussed the patients findings and my recommendations with patient.    Vikki Puentes MD

## 2019-10-09 NOTE — PLAN OF CARE
Problem: Patient Care Overview  Goal: Plan of Care Review  Outcome: Ongoing (interventions implemented as appropriate)   10/09/19 0447   Coping/Psychosocial   Plan of Care Reviewed With patient   Plan of Care Review   Progress no change   OTHER   Outcome Summary C/O pain. treating with PRN meds. NPO since midnight. plan for lithotripsy today.     Goal: Individualization and Mutuality  Outcome: Ongoing (interventions implemented as appropriate)    Goal: Discharge Needs Assessment  Outcome: Ongoing (interventions implemented as appropriate)    Goal: Interprofessional Rounds/Family Conf  Outcome: Ongoing (interventions implemented as appropriate)      Problem: Pain, Chronic (Adult)  Goal: Identify Related Risk Factors and Signs and Symptoms  Outcome: Ongoing (interventions implemented as appropriate)    Goal: Acceptable Pain/Comfort Level and Functional Ability  Outcome: Ongoing (interventions implemented as appropriate)

## 2019-10-10 VITALS
TEMPERATURE: 97.9 F | HEART RATE: 85 BPM | WEIGHT: 232.7 LBS | SYSTOLIC BLOOD PRESSURE: 137 MMHG | DIASTOLIC BLOOD PRESSURE: 82 MMHG | OXYGEN SATURATION: 96 % | BODY MASS INDEX: 38.77 KG/M2 | RESPIRATION RATE: 18 BRPM | HEIGHT: 65 IN

## 2019-10-10 LAB
ALBUMIN SERPL-MCNC: 3.7 G/DL (ref 3.5–5.2)
ALBUMIN/GLOB SERPL: 1.5 G/DL
ALP SERPL-CCNC: 113 U/L (ref 39–117)
ALT SERPL W P-5'-P-CCNC: 152 U/L (ref 1–33)
ANION GAP SERPL CALCULATED.3IONS-SCNC: 9.4 MMOL/L (ref 5–15)
AST SERPL-CCNC: 56 U/L (ref 1–32)
BILIRUB SERPL-MCNC: 0.4 MG/DL (ref 0.2–1.2)
BUN BLD-MCNC: 9 MG/DL (ref 6–20)
BUN/CREAT SERPL: 10.8 (ref 7–25)
CALCIUM SPEC-SCNC: 8.3 MG/DL (ref 8.6–10.5)
CHLORIDE SERPL-SCNC: 105 MMOL/L (ref 98–107)
CO2 SERPL-SCNC: 26.6 MMOL/L (ref 22–29)
CREAT BLD-MCNC: 0.83 MG/DL (ref 0.57–1)
GFR SERPL CREATININE-BSD FRML MDRD: 73 ML/MIN/1.73
GLOBULIN UR ELPH-MCNC: 2.4 GM/DL
GLUCOSE BLD-MCNC: 119 MG/DL (ref 65–99)
POTASSIUM BLD-SCNC: 4.5 MMOL/L (ref 3.5–5.2)
PROT SERPL-MCNC: 6.1 G/DL (ref 6–8.5)
SODIUM BLD-SCNC: 141 MMOL/L (ref 136–145)

## 2019-10-10 PROCEDURE — 99232 SBSQ HOSP IP/OBS MODERATE 35: CPT | Performed by: INTERNAL MEDICINE

## 2019-10-10 PROCEDURE — 25010000002 ONDANSETRON PER 1 MG: Performed by: HOSPITALIST

## 2019-10-10 PROCEDURE — 80053 COMPREHEN METABOLIC PANEL: CPT | Performed by: INTERNAL MEDICINE

## 2019-10-10 RX ORDER — OXYCODONE HYDROCHLORIDE 5 MG/1
5 TABLET ORAL EVERY 4 HOURS PRN
Qty: 8 TABLET | Refills: 0 | Status: SHIPPED | OUTPATIENT
Start: 2019-10-10 | End: 2019-10-17

## 2019-10-10 RX ORDER — CEPHALEXIN 500 MG/1
500 CAPSULE ORAL 2 TIMES DAILY
Qty: 6 CAPSULE | Refills: 0 | Status: SHIPPED | OUTPATIENT
Start: 2019-10-10 | End: 2019-10-13

## 2019-10-10 RX ORDER — ONDANSETRON 4 MG/1
4 TABLET, FILM COATED ORAL EVERY 8 HOURS PRN
Qty: 10 TABLET | Refills: 0 | Status: SHIPPED | OUTPATIENT
Start: 2019-10-10 | End: 2019-11-12

## 2019-10-10 RX ADMIN — FAMOTIDINE 20 MG: 10 INJECTION INTRAVENOUS at 08:45

## 2019-10-10 RX ADMIN — ONDANSETRON 4 MG: 2 INJECTION INTRAMUSCULAR; INTRAVENOUS at 08:45

## 2019-10-10 RX ADMIN — FAMOTIDINE 20 MG: 10 INJECTION INTRAVENOUS at 00:30

## 2019-10-10 RX ADMIN — POLYETHYLENE GLYCOL 3350 17 G: 17 POWDER, FOR SOLUTION ORAL at 08:45

## 2019-10-10 RX ADMIN — SODIUM CHLORIDE 125 ML/HR: 9 INJECTION, SOLUTION INTRAVENOUS at 08:45

## 2019-10-10 RX ADMIN — SODIUM CHLORIDE, PRESERVATIVE FREE 10 ML: 5 INJECTION INTRAVENOUS at 08:45

## 2019-10-10 RX ADMIN — PHENAZOPYRIDINE 200 MG: 200 TABLET ORAL at 00:31

## 2019-10-10 RX ADMIN — OXYCODONE HYDROCHLORIDE 5 MG: 5 TABLET ORAL at 08:45

## 2019-10-10 NOTE — PLAN OF CARE
Problem: Patient Care Overview  Goal: Plan of Care Review  Outcome: Ongoing (interventions implemented as appropriate)   10/09/19 3050   Coping/Psychosocial   Plan of Care Reviewed With patient   Plan of Care Review   Progress improving   OTHER   Outcome Summary A&O x 4 today, up ad edith and ambulating in room. complains of pain, treated with Dilaudid and Oxy IR. Went for lithotripsy today for stone removal. Post procedure, patient states she feels improved but still having burning with urination. Vitals stable. Will continue to monitor.

## 2019-10-10 NOTE — PLAN OF CARE
Problem: Patient Care Overview  Goal: Plan of Care Review  Outcome: Ongoing (interventions implemented as appropriate)   10/10/19 0310   Coping/Psychosocial   Plan of Care Reviewed With patient   OTHER   Outcome Summary pain medication PRN. ABT per doctor order. VSS. urine orange color side effect of pyrimidine.     Goal: Discharge Needs Assessment  Outcome: Ongoing (interventions implemented as appropriate)    Goal: Interprofessional Rounds/Family Conf  Outcome: Ongoing (interventions implemented as appropriate)      Problem: Pain, Chronic (Adult)  Goal: Identify Related Risk Factors and Signs and Symptoms  Outcome: Ongoing (interventions implemented as appropriate)    Goal: Acceptable Pain/Comfort Level and Functional Ability  Outcome: Ongoing (interventions implemented as appropriate)

## 2019-10-10 NOTE — PAYOR COMM NOTE
"Val Pettit (49 y.o. Female)                   ATTENTION;   CONTINUED STAY CLINICALS CASE REF # [id number]                  REPLY TO UR DEPT, GE GARCIA N  UR  [phone]                  CLINICALS FOR 10/7,10/8, 10/9, AND NH SUMMARY FOR 10/7         Date of Birth Social Security Number Address Home Phone MRN    1970  [address] [place] [id number] [phone] [id number]    Caodaism Marital Status          None        Admission Date Admission Type Admitting Provider Attending Provider Department, Room/Bed    10/5/19 Emergency Anthony Corbin MD Nguyen, Minh Loc, MD [address], [address]    Discharge Date Discharge Disposition Discharge Destination         Home or Self Care              Attending Provider:  Kody Kenney MD    Allergies:  Egg White [Albumen, Egg], Meperidine, Erythromycin, Lactose Intolerance (Gi), Orange Juice    Isolation:  None   Infection:  None   Code Status:  CPR    Ht:  163.8 cm (64.5\")   Wt:  106 kg (232 lb 11.2 oz)    Admission Cmt:  None   Principal Problem:  Hepatitis [K75.9]                 Active Insurance as of 10/5/2019     Primary Coverage     Payor Plan Insurance Group Employer/Plan Group    [facility] [employer] [facility] [employer] [facility] PPO [id number]     Payor Plan Address Payor Plan Phone Number Payor Plan Fax Number Effective Dates    [address] [phone]  1/1/2018 - None Entered    [place] [address]       Subscriber Name Subscriber Birth Date Member ID       BROOKETOSHIA GIL 7/21/1965 [id number]                 Emergency Contacts      (Rel.) Home Phone Work Phone Mobile Phone    [email or web address] (Spouse) -- -- [phone]            Lines, Drains & Airways    Active LDAs     Name:   Placement date:   Placement time:   Site:   Days:    Peripheral IV 10/05/19 2051 Right Hand   10/05/19    2051    Hand   4         Inactive LDAs     Name:   Placement date:   Placement time:   " Removal date:   Removal time:   Site:   Days:    [REMOVED] Peripheral IV 10/05/19 1823 Right Antecubital   10/05/19    1823    10/06/19    --    Antecubital   less than 1    [REMOVED] Peripheral IV 10/09/19 1108 Right Hand   10/09/19    1108    10/10/19    0030    Hand   less than 1    [REMOVED] ETT    10/09/19    1233 created via procedure documentation    10/10/19 not present on assessment     0845 not present on assessment      less than 1                   Physician Progress Notes       Vikki Puentes MD at 10/10/19 0832          [facility]  Inpatient Progress Note    Reason for Follow Up:  Elevated liver enzymes    Subjective     Interval History:   Feeling better today following lithotripsy.  Only a little sore in her abdomen but not severe.  Tolerating diet    Current Facility-Administered Medications:   •  diphenhydrAMINE (BENADRYL) capsule 50 mg, 50 mg, Oral, Nightly PRN, Anthony Corbin MD, 50 mg at 10/09/19 2255  •  famotidine (PEPCID) injection 20 mg, 20 mg, Intravenous, Q12H, Anthony Corbin MD, 20 mg at 10/10/19 0030  •  HYDROmorphone (DILAUDID) injection 0.5 mg, 0.5 mg, Intravenous, Q2H PRN, Kody Kenney MD, 0.5 mg at 10/09/19 2020  •  ondansetron (ZOFRAN) tablet 4 mg, 4 mg, Oral, Q6H PRN **OR** ondansetron (ZOFRAN) injection 4 mg, 4 mg, Intravenous, Q6H PRN, Anthony Corbin MD, 4 mg at 10/09/19 1630  •  oxyCODONE (ROXICODONE) immediate release tablet 5 mg, 5 mg, Oral, Q4H PRN, Felipe Paez MD, 5 mg at 10/09/19 2254  •  phenazopyridine (PYRIDIUM) tablet 200 mg, 200 mg, Oral, TID PRN, Felipe Paez MD, 200 mg at 10/10/19 0031  •  polyethylene glycol 3350 powder (packet), 17 g, Oral, Daily, Stingl, Lorna Chino MD, Stopped at 10/09/19 0834  •  Scopolamine (TRANSDERM-SCOP) 1.5 MG/3DAYS patch 1 patch, 1 patch, Transdermal, Q72H, Rolando Maier MD, 1 patch at 10/09/19 1110  •  [COMPLETED] Insert peripheral IV, , , Once **AND** sodium chloride  0.9 % flush 10 mL, 10 mL, Intravenous, PRN, Rolando Lemus MD, 10 mL at 10/06/19 0237  •  sodium chloride 0.9 % flush 10 mL, 10 mL, Intravenous, Q12H, Anthony Corbin MD, 10 mL at 10/09/19 2021  •  sodium chloride 0.9 % flush 10 mL, 10 mL, Intravenous, PRN, Anthony Corbin MD  •  sodium chloride 0.9 % infusion, 125 mL/hr, Intravenous, Continuous, Anthony Corbin MD, Last Rate: 125 mL/hr at 10/09/19 1630, 125 mL/hr at 10/09/19 1630  Review of Systems:   All systems reviewed and negative except for: GI: Upper abdominal pain, mild    Objective     Vital Signs  Temp:  [97.7 °F (36.5 °C)-98.4 °F (36.9 °C)] 97.9 °F (36.6 °C)  Heart Rate:  [65-94] 85  Resp:  [9-18] 18  BP: (114-149)/(69-97) 137/82  Body mass index is 39.33 kg/m².    Intake/Output Summary (Last 24 hours) at 10/10/2019 0832  Last data filed at 10/10/2019 0735  Gross per 24 hour   Intake 1870 ml   Output 2755 ml   Net -885 ml     I/O this shift:  In: -   Out: 400 [Urine:400]     Physical Exam:   General: patient awake, alert and cooperative, nondistressed   Eyes: Normal lids and lashes, no scleral icterus   Neck: supple, normal ROM   Skin: warm and dry, not jaundiced   Cardiovascular: regular rhythm and rate, no murmurs auscultated   Pulm: clear to auscultation bilaterally, regular and unlabored   Abdomen: soft, obese, nontender, nondistended; normal bowel sounds   Rectal: deferred   Extremities: no rash or edema   Psychiatric: Normal mood and behavior; memory intact     Results Review:     I reviewed the patient's new clinical results.    Results from last 7 days   Lab Units 10/09/19  0323 10/07/19  0309 10/06/19  1716   WBC 10*3/mm3 6.99 5.58 5.53   HEMOGLOBIN g/dL 9.9* 9.9* 11.2*   HEMATOCRIT % 31.7* 30.9* 34.9   PLATELETS 10*3/mm3 208 166 168     Results from last 7 days   Lab Units 10/10/19  0324 10/09/19  0323 10/07/19  0309   SODIUM mmol/L 141 144  142 141   POTASSIUM mmol/L 4.5 4.5  4.4 4.3   CHLORIDE mmol/L 105 107  106 108*    CO2 mmol/L 26.6 24.0  25.6 23.8   BUN mg/dL 9 4*  4* 7   CREATININE mg/dL 0.83 0.84  0.86 1.01*   CALCIUM mg/dL 8.3* 8.1*  8.0* 7.8*   BILIRUBIN mg/dL 0.4 0.4  0.3 0.5   ALK PHOS U/L 113 114  117 115   ALT (SGPT) U/L 152* 188*  189* 307*   AST (SGOT) U/L 56* 84*  84* 256*   GLUCOSE mg/dL 119* 85  87 89     Results from last 7 days   Lab Units 10/07/19  0309 10/06/19  1607 10/05/19  2050   INR  0.98 1.01 0.99     Lab Results   Lab Value Date/Time    LIPASE 20 10/05/2019 1821       Radiology:  CT Abdomen Pelvis Without Contrast   Final Result   1.  Fatty liver with 14 mm low-density right hepatic lobe lesion.   Follow-up recommended.   2. 2.9 cm parapelvic left renal cyst.   3. Mild diverticulosis.   4. Probable collection of tiny stones in the distal right ureter as   discussed. No significant hydronephrosis                   This report was finalized on 10/5/2019 11:00 PM by Felipe Jacobsen M.D.              Assessment/Plan     Patient Active Problem List   Diagnosis   • Left upper quadrant pain   • Hepatitis   • LFT elevation   • UTI (urinary tract infection), bacterial   • Hypotension   • Tachycardia       Impression  1. Elevated liver enzymes: Continued improvement    2. Recent bactrim and tylenol use: possible cause to #1    3. R liver lobe lesion: 14mm, unclear etiology    4. Hepatic steatosis    Plan  Anticipate continued improvement in her transaminases  Okay for discharge from GI standpoint  Recommend repeat CMP with her PCP in 1 week  Will arrange for outpatient hospital follow-up to follow the right liver lobe lesion-- we will get an MRI at that time      I discussed the patients findings and my recommendations with patient.    Vikki Puentes MD    Electronically signed by Vikki Puentes MD at 10/10/19 0835     Kody Kenney MD at 10/09/19 0913                              [facility]     LOS: 4 days     Name: Val Pettit  Age: 49 y.o.  Sex:  female  :  1970  MRN: [id number]         Primary Care Physician: Xu Kyle MD    NPO Diet    Subjective   Still with some right-sided flank pain positional but otherwise feels okay.    Review of Systems   Respiratory: Negative for shortness of breath.    Cardiovascular: Negative for chest pain.   Genitourinary: Positive for flank pain.     Objective   Temp:  [97.4 °F (36.3 °C)-97.8 °F (36.6 °C)] 97.4 °F (36.3 °C)  Heart Rate:  [65-82] 65  Resp:  [18] 18  BP: (103-117)/(53-74) 114/74  SpO2:  [84 %-97 %] 91 %  on  Flow (L/min):  [2] 2;   Device (Oxygen Therapy): nasal cannula  Body mass index is 39.33 kg/m².    Physical Exam   Constitutional: She is oriented to person, place, and time. No distress.   Cardiovascular: Normal rate and regular rhythm.   Pulmonary/Chest: Effort normal and breath sounds normal. No respiratory distress.   Abdominal: Soft. Bowel sounds are normal. There is no tenderness. There is no rebound and no guarding.   Musculoskeletal: She exhibits no edema.   Neurological: She is alert and oriented to person, place, and time.   Skin: Skin is warm and dry.   Psychiatric: She has a normal mood and affect. Her behavior is normal.   Nursing note and vitals reviewed.    Reviewed medications and new clinical results    Scheduled Meds  ceftriaxone 1 g Intravenous Q24H   famotidine 20 mg Intravenous Q12H   polyethylene glycol 17 g Oral Daily   sodium chloride 10 mL Intravenous Q12H     Continuous Infusions  sodium chloride 125 mL/hr Last Rate: 125 mL/hr (10/08/19 1724)     PRN Meds  diphenhydrAMINE  •  HYDROmorphone  •  ondansetron **OR** ondansetron  •  oxyCODONE  •  phenazopyridine  •  [COMPLETED] Insert peripheral IV **AND** sodium chloride  •  sodium chloride    Results from last 7 days   Lab Units 10/09/19  0323 10/07/19  0309 10/06/19  1716 10/05/19  1821   WBC 10*3/mm3 6.99 5.58 5.53 4.41   HEMOGLOBIN g/dL 9.9* 9.9* 11.2* 13.4   PLATELETS 10*3/mm3 208 166 168 209     Results from last 7  days   Lab Units 10/09/19  0323 10/07/19  0309 10/06/19  1606 10/06/19  0319 10/05/19  1821   SODIUM mmol/L 142 141 140 138 137   POTASSIUM mmol/L 4.4 4.3 4.1 3.8 4.0   CHLORIDE mmol/L 106 108* 108* 104 102   CO2 mmol/L 25.6 23.8 22.9 18.2* 20.2*   BUN mg/dL 4* 7 8 10 8   CREATININE mg/dL 0.86 1.01* 0.97 1.19* 1.29*   CALCIUM mg/dL 8.0* 7.8* 7.6* 7.5* 8.9   GLUCOSE mg/dL 87 89 90 104* 108*     Lab Results   Component Value Date    ANIONGAP 10.4 10/09/2019     Results from last 7 days   Lab Units 10/07/19  0309 10/06/19  1606 10/06/19  0319   ALK PHOS U/L 115 121* 115   BILIRUBIN mg/dL 0.5 0.8 1.1   ALT (SGPT) U/L 307* 356* 362*   AST (SGOT) U/L 256* 397* 422*     Estimated Creatinine Clearance: 95.9 mL/min (by C-G formula based on SCr of 0.86 mg/dL).    I personally reviewed CT abd/pelvis    Assessment/Plan   Active Hospital Problems    Diagnosis  POA   • **Hepatitis [K75.9]  Unknown   • LFT elevation [R94.5]  Unknown   • UTI (urinary tract infection), bacterial [N39.0, A49.9]  Unknown   • Hypotension [I95.9]  Unknown   • Tachycardia [R00.0]  Unknown   • Left upper quadrant pain [R10.12]  Yes      Resolved Hospital Problems   No resolved problems to display.     49 y.o. female     · Hepatitis: LFTs improving.  Continue supportive care and will need close follow-up monitoring LFTs at discharge.  Will add on to existing specimen in lab for today  · UTI right ureterolithiasis: Intervention today by urology.  Continue analgesia, to reduce dose of IV narcotic as needed.  She is on ceftriaxone  · Anemia: Monitor drop in hemoglobin suspect dilution  · disposition to be determined  · discussed with patient and nursing staff.    Kody Kenney MD   10/09/19  9:13 AM      Electronically signed by Kody Kenney MD at 10/09/19 0923     Vikki Puentes MD at 10/09/19 0906          [facility]  Inpatient Progress Note    Reason for Follow Up:  Elevated liver enzymes    Subjective     Interval  History:   Complains of kidney pain today.  Plans for stone removal later this morning.    Current Facility-Administered Medications:   •  cefTRIAXone (ROCEPHIN) IVPB 1 g, 1 g, Intravenous, Q24H, Lorna Albarado MD, Last Rate: 100 mL/hr at 10/08/19 2207, 1 g at 10/08/19 2207  •  diphenhydrAMINE (BENADRYL) capsule 50 mg, 50 mg, Oral, Nightly PRN, Anthony Corbin MD, 50 mg at 10/08/19 2223  •  famotidine (PEPCID) injection 20 mg, 20 mg, Intravenous, Q12H, Anthony Corbin MD, 20 mg at 10/09/19 0834  •  HYDROmorphone (DILAUDID) injection 1 mg, 1 mg, Intravenous, Q2H PRN, Satish Sibley Jr., MD, 1 mg at 10/09/19 0835  •  ondansetron (ZOFRAN) tablet 4 mg, 4 mg, Oral, Q6H PRN **OR** ondansetron (ZOFRAN) injection 4 mg, 4 mg, Intravenous, Q6H PRN, Anthony Corbin MD, 4 mg at 10/08/19 2306  •  oxyCODONE (ROXICODONE) immediate release tablet 5 mg, 5 mg, Oral, Q4H PRN, Felipe Paez MD, 5 mg at 10/09/19 0419  •  phenazopyridine (PYRIDIUM) tablet 200 mg, 200 mg, Oral, TID PRN, Felipe Paez MD, 200 mg at 10/08/19 1711  •  polyethylene glycol 3350 powder (packet), 17 g, Oral, Daily, Lorna Albarado MD, Stopped at 10/09/19 0834  •  [COMPLETED] Insert peripheral IV, , , Once **AND** sodium chloride 0.9 % flush 10 mL, 10 mL, Intravenous, PRN, Rolando Lemus MD, 10 mL at 10/06/19 0237  •  sodium chloride 0.9 % flush 10 mL, 10 mL, Intravenous, Q12H, Anthony Corbin MD, 10 mL at 10/09/19 0835  •  sodium chloride 0.9 % flush 10 mL, 10 mL, Intravenous, PRN, Anthony Corbin MD  •  sodium chloride 0.9 % infusion, 125 mL/hr, Intravenous, Continuous, Anthony Corbin MD, Last Rate: 125 mL/hr at 10/08/19 1724, 125 mL/hr at 10/08/19 1724  Review of Systems:   All systems reviewed and negative except for: : Kidney pain    Objective     Vital Signs  Temp:  [97.4 °F (36.3 °C)-97.8 °F (36.6 °C)] 97.4 °F (36.3 °C)  Heart Rate:  [65-82] 65  Resp:  [18] 18  BP: (103-117)/(53-74)  114/74  Body mass index is 39.33 kg/m².    Intake/Output Summary (Last 24 hours) at 10/9/2019 0906  Last data filed at 10/9/2019 0714  Gross per 24 hour   Intake 2480 ml   Output 2100 ml   Net 380 ml     I/O this shift:  In: -   Out: 350 [Urine:350]     Physical Exam:   General: patient awake, alert and cooperative, in mild discomfort   Eyes: Normal lids and lashes, no scleral icterus   Neck: supple, normal ROM   Skin: warm and dry, not jaundiced   Cardiovascular: regular rhythm and rate, no murmurs auscultated   Pulm: clear to auscultation bilaterally, regular and unlabored   Abdomen: soft, obese, nontender, nondistended; normal bowel sounds   Rectal: deferred   Extremities: no rash or edema   Psychiatric: Normal mood and behavior; memory intact     Results Review:     I reviewed the patient's new clinical results.    Results from last 7 days   Lab Units 10/09/19  0323 10/07/19  0309 10/06/19  1716   WBC 10*3/mm3 6.99 5.58 5.53   HEMOGLOBIN g/dL 9.9* 9.9* 11.2*   HEMATOCRIT % 31.7* 30.9* 34.9   PLATELETS 10*3/mm3 208 166 168     Results from last 7 days   Lab Units 10/09/19  0323 10/07/19  0309 10/06/19  1606 10/06/19  0319   SODIUM mmol/L 142 141 140 138   POTASSIUM mmol/L 4.4 4.3 4.1 3.8   CHLORIDE mmol/L 106 108* 108* 104   CO2 mmol/L 25.6 23.8 22.9 18.2*   BUN mg/dL 4* 7 8 10   CREATININE mg/dL 0.86 1.01* 0.97 1.19*   CALCIUM mg/dL 8.0* 7.8* 7.6* 7.5*   BILIRUBIN mg/dL  --  0.5 0.8 1.1   ALK PHOS U/L  --  115 121* 115   ALT (SGPT) U/L  --  307* 356* 362*   AST (SGOT) U/L  --  256* 397* 422*   GLUCOSE mg/dL 87 89 90 104*     Results from last 7 days   Lab Units 10/07/19  0309 10/06/19  1607 10/05/19  2050   INR  0.98 1.01 0.99     Lab Results   Lab Value Date/Time    LIPASE 20 10/05/2019 1821       Radiology:  CT Abdomen Pelvis Without Contrast   Final Result   1.  Fatty liver with 14 mm low-density right hepatic lobe lesion.   Follow-up recommended.   2. 2.9 cm parapelvic left renal cyst.   3. Mild  diverticulosis.   4. Probable collection of tiny stones in the distal right ureter as   discussed. No significant hydronephrosis                   This report was finalized on 10/5/2019 11:00 PM by Felipe Jacobsen M.D.              Assessment/Plan     Patient Active Problem List   Diagnosis   • Left upper quadrant pain   • Hepatitis   • LFT elevation   • UTI (urinary tract infection), bacterial   • Hypotension   • Tachycardia       Impression  1. Elevated liver enzymes: had been improving, no labs yesterday or this morning    2. Recent bactrim and tylenol use: possible culprit to #1    3. R liver lobe lesion: 14mm, unclear etiology    4. Hepatic steatosis    Plan  Add CMP to this morning's labs  Daily CMP  She is going to need repeat imaging to further assess this liver lesion; recommend MRI in the next few weeks    I discussed the patients findings and my recommendations with patient.    Vikki Puentes MD    Electronically signed by Vikki Puentes MD at 10/09/19 0931     JoseHon[facility] Shea Medical CenterGriffin MD at 10/08/19 1413          [facility]  Inpatient Progress Note    Reason for Follow Up:  Elevated liver enzymes    Subjective     Interval History:   She is without GI complaints.  Does c/o worsening flank pain.     Current Facility-Administered Medications:   •  cefTRIAXone (ROCEPHIN) IVPB 1 g, 1 g, Intravenous, Q24H, Lorna Albarado MD  •  diphenhydrAMINE (BENADRYL) capsule 50 mg, 50 mg, Oral, Nightly PRN, Anthony Corbin MD, 50 mg at 10/07/19 2200  •  famotidine (PEPCID) injection 20 mg, 20 mg, Intravenous, Q12H, Anthony Corbin MD, 20 mg at 10/08/19 0836  •  HYDROmorphone (DILAUDID) injection 0.5 mg, 0.5 mg, Intravenous, Q2H PRN, Lorna Albarado MD  •  HYDROmorphone (DILAUDID) injection 1 mg, 1 mg, Intravenous, Q2H PRN, Satish Sibley Jr., MD, 1 mg at 10/08/19 1319  •  ondansetron (ZOFRAN) tablet 4 mg, 4 mg, Oral, Q6H PRN **OR** ondansetron (ZOFRAN) injection 4  mg, 4 mg, Intravenous, Q6H PRN, Anthony Corbin MD, 4 mg at 10/08/19 1132  •  oxyCODONE (ROXICODONE) immediate release tablet 5 mg, 5 mg, Oral, Q4H PRN, Felipe Paez MD, 5 mg at 10/08/19 1248  •  phenazopyridine (PYRIDIUM) tablet 200 mg, 200 mg, Oral, TID PRN, Felipe Paez MD, 200 mg at 10/08/19 0836  •  [COMPLETED] Insert peripheral IV, , , Once **AND** sodium chloride 0.9 % flush 10 mL, 10 mL, Intravenous, PRN, Rolando Lemus MD, 10 mL at 10/06/19 0237  •  sodium chloride 0.9 % flush 10 mL, 10 mL, Intravenous, Q12H, Anthony Corbin MD, 10 mL at 10/08/19 0836  •  sodium chloride 0.9 % flush 10 mL, 10 mL, Intravenous, PRN, Anthony Corbin MD  •  sodium chloride 0.9 % infusion, 125 mL/hr, Intravenous, Continuous, Anthony Corbin MD, Last Rate: 125 mL/hr at 10/08/19 1008, 125 mL/hr at 10/08/19 1008  Review of Systems:    The following systems were reviewed and negative;  constitution and gastrointestinal    Objective     Vital Signs  Temp:  [97.7 °F (36.5 °C)-98.2 °F (36.8 °C)] 97.7 °F (36.5 °C)  Heart Rate:  [74-82] 75  Resp:  [18] 18  BP: (103-122)/(53-73) 103/53  Body mass index is 39.33 kg/m².    Intake/Output Summary (Last 24 hours) at 10/8/2019 1413  Last data filed at 10/8/2019 1100  Gross per 24 hour   Intake 2800 ml   Output 2485 ml   Net 315 ml     I/O this shift:  In: 1120 [P.O.:120; I.V.:1000]  Out: 300 [Urine:300]     Physical Exam:   General: patient awake, alert and cooperative   Abdomen: soft, nontender, nondistended; normal bowel sounds   Rectal: deferred   Extremities: no rash or edema   Psychiatric: Normal mood and behavior; memory intact     Results Review:     I reviewed the patient's new clinical results.    Results from last 7 days   Lab Units 10/07/19  0309 10/06/19  1716 10/05/19  1821   WBC 10*3/mm3 5.58 5.53 4.41   HEMOGLOBIN g/dL 9.9* 11.2* 13.4   HEMATOCRIT % 30.9* 34.9 41.9   PLATELETS 10*3/mm3 166 168 209     Results from last 7 days   Lab Units  10/07/19  0309 10/06/19  1606 10/06/19  0319   SODIUM mmol/L 141 140 138   POTASSIUM mmol/L 4.3 4.1 3.8   CHLORIDE mmol/L 108* 108* 104   CO2 mmol/L 23.8 22.9 18.2*   BUN mg/dL 7 8 10   CREATININE mg/dL 1.01* 0.97 1.19*   CALCIUM mg/dL 7.8* 7.6* 7.5*   BILIRUBIN mg/dL 0.5 0.8 1.1   ALK PHOS U/L 115 121* 115   ALT (SGPT) U/L 307* 356* 362*   AST (SGOT) U/L 256* 397* 422*   GLUCOSE mg/dL 89 90 104*     Results from last 7 days   Lab Units 10/07/19  0309 10/06/19  1607 10/05/19  2050   INR  0.98 1.01 0.99     Lab Results   Lab Value Date/Time    LIPASE 20 10/05/2019 1821         Assessment/Plan   Assessment:   1.   Elevated liver enzymes - hepatocellular pattern, improving  2.   Recent bactrim use  3.   Recent tylenol use  4.   Hepatic steatosis    Plan:   LFTs improving with continued normalization of INR.  There is concern about antecedent excess use of tylenol, but no indication for NAC at this point.  I also wonder if there could be an element of DILI from recent bactrim use, as she did have modest absolute eosinophilia on admission.  Either way the treatment would appear to be supportive at this point given continued improvement in LFTs.  She will need close outpt monitoring of LFTs upon discharge.      I discussed the patients findings and my recommendations with patient.         Griffin Lorenz M.D.  [facility]  [address] [place] [address]  Office: [phone]              Electronically signed by Griffin Lorenz MD at 10/08/19 1412     Lorna Albarado MD at 10/08/19 1324          DAILY PROGRESS NOTE  [facility]    Patient Identification:  Name: Val Pettit  Age: 49 y.o.  Sex: female  :  1970  MRN: [id number]         Primary Care Physician: Xu Kyle MD    Subjective: patient is having quite a bit more pain today; it is not controlled with po meds  Interval History: follow up for hematuria, kidney stone,  "abdominal pain, obesity     Objective:    Scheduled Meds:  famotidine 20 mg Intravenous Q12H   sodium chloride 10 mL Intravenous Q12H     Continuous Infusions:  sodium chloride 125 mL/hr Last Rate: 125 mL/hr (10/08/19 1008)       Vital signs in last 24 hours:  Temp:  [98 °F (36.7 °C)-98.2 °F (36.8 °C)] 98 °F (36.7 °C)  Heart Rate:  [74-82] 74  Resp:  [18] 18  BP: (117-126)/(60-79) 122/73    Intake/Output:    Intake/Output Summary (Last 24 hours) at 10/8/2019 1321  Last data filed at 10/8/2019 1100  Gross per 24 hour   Intake 2800 ml   Output 2485 ml   Net 315 ml       Exam:  /73 (BP Location: Left arm, Patient Position: Sitting)   Pulse 74   Temp 98 °F (36.7 °C) (Oral)   Resp 18   Ht 163.8 cm (64.5\")   Wt 106 kg (232 lb 11.2 oz)   SpO2 94%   BMI 39.33 kg/m²      General Appearance:    Alert, cooperative, no distress, AAOx3                          Head:    Normocephalic, without obvious abnormality, atraumatic                           Eyes:    PERRL, conjunctiva/corneas clear, EOM's intact, both eyes                         Throat:   Lips, tongue, gums normal; oral mucosa pink and moist                           Neck:   Supple, symmetrical, trachea midline, no JVD                         Lungs:    Decreased breath sounds bilaterally, respirations unlabored                 Chest Wall:    No tenderness or deformity                          Heart:    Regular rate and rhythm, S1 and S2 normal, no murmur,no  Rub                                      or gallop                  Abdomen:     Soft, non-tender, bowel sounds active, no masses, no                                                        organomegaly                  Extremities:   Extremities normal, atraumatic, no cyanosis or edema                        Pulses:   Pulses palpable in all extremities                            Skin:   Skin is warm and dry,  no rashes or palpable lesions             Data Review:  Labs in chart were reviewed.  Lab " Results   Component Value Date    WBC 5.58 10/07/2019    HGB 9.9 (L) 10/07/2019    HCT 30.9 (L) 10/07/2019     10/07/2019     Lab Results   Component Value Date     10/07/2019    K 4.3 10/07/2019     (H) 10/07/2019    CO2 23.8 10/07/2019    BUN 7 10/07/2019    CREATININE 1.01 (H) 10/07/2019    GLUCOSE 89 10/07/2019     Lab Results   Component Value Date    CALCIUM 7.8 (L) 10/07/2019     Lab Results   Component Value Date     (H) 10/07/2019     (H) 10/07/2019    ALKPHOS 115 10/07/2019     Lab Results   Component Value Date    INR 0.98 10/07/2019     Patient Active Problem List   Diagnosis Code   • Left upper quadrant pain R10.12   • Hepatitis K75.9   • LFT elevation R94.5   • UTI (urinary tract infection), bacterial N39.0, A49.9   • Hypotension I95.9   • Tachycardia R00.0       Assessment:    Hepatitis    Left upper quadrant pain    LFT elevation    UTI (urinary tract infection), bacterial    Hypotension    Tachycardia  kidney stone  Acute blood loss anemia    Plan:  Will continue fluids  Add iv pain meds for now  Awaiting input from urology   She will need intervention for her stone  Presently not ready for discharge  Rocephin for uti  Medium risk  D.w patient and nurse  Lorna Albarado MD  10/8/2019  1:21 PM      Electronically signed by Lorna Albarado MD at 10/08/19 1325     Felipe Paez MD at 10/07/19 1145              Name: Val Pettit ADMIT: 10/5/2019   : 1970  PCP: Xu Kyle MD    MRN: [id number] LOS: 2 days   AGE/SEX: 49 y.o. female  ROOM: S401/     Subjective   Subjective   CC: abdominal/flank pain  No acute events. Right flank pain continues-she is concerned because this has been going on for 3 weeks.  Taking PO. No CP/dyspnea/f/c. +nausea, no vomiting or diarrhea.    Objective   Objective   Vital Signs  Temp:  [97.8 °F (36.6 °C)-98.1 °F (36.7 °C)] 98.1 °F (36.7 °C)  Heart Rate:  [79-91] 86  Resp:  [16-18] 18  BP: ()/(51-69)  112/67  SpO2:  [92 %-94 %] 94 %  on   ;   Device (Oxygen Therapy): room air  Body mass index is 38.06 kg/m².  Physical Exam   Constitutional: She is oriented to person, place, and time. No distress.   HENT:   Head: Normocephalic and atraumatic.   Mouth/Throat: Oropharynx is clear and moist.   Eyes: Conjunctivae and EOM are normal. Pupils are equal, round, and reactive to light.   Neck: Normal range of motion. Neck supple.   Cardiovascular: Normal rate, regular rhythm and intact distal pulses.   Pulmonary/Chest: Effort normal and breath sounds normal.   Abdominal: Soft. Bowel sounds are normal. There is no tenderness.   Musculoskeletal: She exhibits no edema or tenderness.   Neurological: She is alert and oriented to person, place, and time.   Skin: Skin is warm and dry. She is not diaphoretic.   Psychiatric: She has a normal mood and affect. Her behavior is normal.   Nursing note and vitals reviewed.      Results Review:       I reviewed the patient's new clinical results.  Results from last 7 days   Lab Units 10/07/19  0309 10/06/19  1716 10/05/19  1821   WBC 10*3/mm3 5.58 5.53 4.41   HEMOGLOBIN g/dL 9.9* 11.2* 13.4   PLATELETS 10*3/mm3 166 168 209     Results from last 7 days   Lab Units 10/07/19  0309 10/06/19  1606 10/06/19  0319 10/05/19  1821   SODIUM mmol/L 141 140 138 137   POTASSIUM mmol/L 4.3 4.1 3.8 4.0   CHLORIDE mmol/L 108* 108* 104 102   CO2 mmol/L 23.8 22.9 18.2* 20.2*   BUN mg/dL 7 8 10 8   CREATININE mg/dL 1.01* 0.97 1.19* 1.29*   GLUCOSE mg/dL 89 90 104* 108*   Estimated Creatinine Clearance: 79.9 mL/min (A) (by C-G formula based on SCr of 1.01 mg/dL (H)).  Results from last 7 days   Lab Units 10/07/19  0309 10/06/19  1606 10/06/19  0319 10/05/19  1821   ALBUMIN g/dL 3.30* 3.50 3.40* 4.20   BILIRUBIN mg/dL 0.5 0.8 1.1 0.7   ALK PHOS U/L 115 121* 115 148*   AST (SGOT) U/L 256* 397* 422* 620*   ALT (SGPT) U/L 307* 356* 362* 431*     Results from last 7 days   Lab Units 10/07/19  0309 10/06/19  3372  10/06/19  0319 10/05/19  1821   CALCIUM mg/dL 7.8* 7.6* 7.5* 8.9   ALBUMIN g/dL 3.30* 3.50 3.40* 4.20       No results found for: HGBA1C, POCGLU      ceftriaxone 1 g Intravenous Q24H   famotidine 20 mg Intravenous Q12H   sodium chloride 10 mL Intravenous Q12H       sodium chloride 125 mL/hr Last Rate: 125 mL/hr (10/07/19 0827)   Diet Regular; Lactose Restricted      Assessment/Plan     Active Hospital Problems    Diagnosis  POA   • **Hepatitis [K75.9]  Unknown   • LFT elevation [R94.5]  Unknown   • UTI (urinary tract infection), bacterial [N39.0, A49.9]  Unknown   • Hypotension [I95.9]  Unknown   • Tachycardia [R00.0]  Unknown   • Left upper quadrant pain [R10.12]  Yes      Resolved Hospital Problems   No resolved problems to display.   Acute Hepatitis  - hepatis panel is negative  - improving-no indication for NAC  - continue supportive care, IVF  - of course no more tylenol for now  - GI following, appreciate recs    UTI  - continue on ceftriaxone  - pyridium for dysuria    Right flank Pain  - has what looks like some right ureterolithiasis  - she c/o ongoing pain for the past 3 weeks in this area-stones are small but given the duration of symptoms will ask urology to evaluate    SCDs for DVT prophylaxis.  Full code.  Discussed with patient and nursing staff.  Anticipate discharge home possibly later today depending on urology recs      Felipe Paez MD  [facility]  10/07/19  11:45 AM          Electronically signed by Felipe Paez MD at 10/07/19 1148     Griffin Lorenz MD at 10/07/19 0911          [facility]  Inpatient Progress Note    Reason for Follow Up:  Elevated liver enzymes    Subjective     Interval History:   She is without GI complaints.    Current Facility-Administered Medications:   •  cefTRIAXone (ROCEPHIN) IVPB 1 g, 1 g, Intravenous, Q24H, Anthony Corbin MD, Last Rate: 100 mL/hr at 10/07/19 0031, 1 g at 10/07/19 0031  •   diphenhydrAMINE (BENADRYL) capsule 50 mg, 50 mg, Oral, Nightly PRN, Anthony Corbin MD, 50 mg at 10/06/19 2126  •  famotidine (PEPCID) injection 20 mg, 20 mg, Intravenous, Q12H, Anthony Corbin MD, 20 mg at 10/07/19 0833  •  morphine injection 2 mg, 2 mg, Intravenous, Q4H PRN, Anthony Corbin MD, 2 mg at 10/07/19 0833  •  ondansetron (ZOFRAN) tablet 4 mg, 4 mg, Oral, Q6H PRN **OR** ondansetron (ZOFRAN) injection 4 mg, 4 mg, Intravenous, Q6H PRN, Anthony Corbin MD, 4 mg at 10/06/19 0744  •  phenazopyridine (PYRIDIUM) tablet 200 mg, 200 mg, Oral, TID, Felipe Paez MD, 200 mg at 10/07/19 0833  •  [COMPLETED] Insert peripheral IV, , , Once **AND** sodium chloride 0.9 % flush 10 mL, 10 mL, Intravenous, PRN, Rolando Lemus MD, 10 mL at 10/06/19 0237  •  sodium chloride 0.9 % flush 10 mL, 10 mL, Intravenous, Q12H, Anthony Corbin MD, 10 mL at 10/07/19 0833  •  sodium chloride 0.9 % flush 10 mL, 10 mL, Intravenous, PRN, Anthony Corbin MD  •  sodium chloride 0.9 % infusion, 125 mL/hr, Intravenous, Continuous, Anthony Corbin MD, Last Rate: 125 mL/hr at 10/07/19 0827, 125 mL/hr at 10/07/19 0827  Review of Systems:    The following systems were reviewed and negative;  constitution and gastrointestinal    Objective     Vital Signs  Temp:  [97.8 °F (36.6 °C)-98.1 °F (36.7 °C)] 98.1 °F (36.7 °C)  Heart Rate:  [79-91] 86  Resp:  [16-18] 18  BP: ()/(51-69) 112/67  Body mass index is 38.06 kg/m².    Intake/Output Summary (Last 24 hours) at 10/7/2019 0911  Last data filed at 10/7/2019 0827  Gross per 24 hour   Intake 2120 ml   Output 1000 ml   Net 1120 ml     I/O this shift:  In: 1000 [I.V.:1000]  Out: -      Physical Exam:   General: patient awake, alert and cooperative   Eyes: Normal lids and lashes, no scleral icterus   Neck: supple, normal ROM   Skin: warm and dry, not jaundiced   Cardiovascular: regular rhythm and rate, no murmurs auscultated   Pulm: clear to auscultation  bilaterally, regular and unlabored   Abdomen: soft, nontender, nondistended; normal bowel sounds   Rectal: deferred   Extremities: no rash or edema   Psychiatric: Normal mood and behavior; memory intact     Results Review:     I reviewed the patient's new clinical results.    Results from last 7 days   Lab Units 10/07/19  0309 10/06/19  1716 10/05/19  1821   WBC 10*3/mm3 5.58 5.53 4.41   HEMOGLOBIN g/dL 9.9* 11.2* 13.4   HEMATOCRIT % 30.9* 34.9 41.9   PLATELETS 10*3/mm3 166 168 209     Results from last 7 days   Lab Units 10/07/19  0309 10/06/19  1606 10/06/19  0319   SODIUM mmol/L 141 140 138   POTASSIUM mmol/L 4.3 4.1 3.8   CHLORIDE mmol/L 108* 108* 104   CO2 mmol/L 23.8 22.9 18.2*   BUN mg/dL 7 8 10   CREATININE mg/dL 1.01* 0.97 1.19*   CALCIUM mg/dL 7.8* 7.6* 7.5*   BILIRUBIN mg/dL 0.5 0.8 1.1   ALK PHOS U/L 115 121* 115   ALT (SGPT) U/L 307* 356* 362*   AST (SGOT) U/L 256* 397* 422*   GLUCOSE mg/dL 89 90 104*     Results from last 7 days   Lab Units 10/07/19  0309 10/06/19  1607 10/05/19  2050   INR  0.98 1.01 0.99     Lab Results   Lab Value Date/Time    LIPASE 20 10/05/2019 1821         Assessment/Plan   Assessment:   1.   Elevated liver enzymes - hepatocellular pattern, improving  2.   Recent bactrim use  3.   Recent tylenol use  4.   Hepatic steatosis    Plan:   LFTs improving with continued normalization of INR.  There is concern about antecedent excess use of tylenol, but no indication for NAC at this point.  I also wonder if there could be an element of DILI from recent bactrim use, as she did have modest absolute eosinophilia on admission.  Either way the treatment would appear to be supportive at this point given continued improvement in LFTs.  She will need close outpt monitoring of LFTs upon discharge.      I discussed the patients findings and my recommendations with patient.         Griffin Lorenz M.D.  [facility]  [address]  [address]  Office: [phone]              Electronically signed by Griffin Lorenz MD at 10/07/19 5524       Consult Notes       Griffin Mckeon MD at 10/06/19 1500      Consult Orders    1. Inpatient Gastroenterology Consult [[id number]] ordered by Anthony Corbin MD at 10/06/19 0015                [facility]  Initial Inpatient Consult Note    Referring Provider: Dr. Corbin    Reason for Consultation: Elevated liver tests    Subjective     History of present illness:    49 y.o. female with a history of kidney stones who has been taking Tylenol roughly 2 g a day for the last 5 days, occasionally more doses, had nausea vomiting abdominal discomfort in the right upper quadrant.  Dysuria, may have been bloody urine, came to the emergency room.  Found to have elevated liver test.  She is also been taking Pyridium as well as Bactrim for UTI.  She is never had problems with her liver before.  Tylenol level in the emergency room was 10, N-acetylcysteine was started but then discontinued.  At the time I am seeing her now 12 hours later she is asymptomatic.    She does not use alcohol or illicit drugs.  No other hepatotoxic medications her med list    Past Medical History:  Past Medical History:   Diagnosis Date   • Arthritis    • GERD (gastroesophageal reflux disease)    • History of transfusion    • Hypotension    • IBS (irritable bowel syndrome)    • PONV (postoperative nausea and vomiting)      Past Surgical History:  Past Surgical History:   Procedure Laterality Date   • ANKLE SURGERY Right    • BACK SURGERY      herniated disc removal   •  SECTION     • CHOLECYSTECTOMY     • COLONOSCOPY     • ENDOSCOPY     • JOINT REPLACEMENT     • KNEE SURGERY Bilateral     x3 each   • SHOULDER SURGERY Left     x2   • SKIN BIOPSY        Social History:   Social History     Tobacco Use   • Smoking status: Never Smoker   • Smokeless tobacco: Never Used   Substance Use Topics   • Alcohol use:  No     Frequency: Never      Family History:  History reviewed. No pertinent family history.    Home Meds:  Medications Prior to Admission   Medication Sig Dispense Refill Last Dose   • Acetaminophen (ACETAMIN PO) Take 650 mg by mouth Every 6 (Six) Hours As Needed.      • diphenhydrAMINE (BENADRYL) 50 MG capsule Take 50 mg by mouth At Night As Needed.      • phenazopyridine (PYRIDIUM) 200 MG tablet Take 200 mg by mouth 3 (Three) Times a Day.      • sulfamethoxazole-trimethoprim (BACTRIM DS,SEPTRA DS) 800-160 MG per tablet Take 1 tablet by mouth 2 (Two) Times a Day.        Current Meds:     ceftriaxone 1 g Intravenous Q24H   famotidine 20 mg Intravenous Q12H   sodium chloride 10 mL Intravenous Q12H     Allergies:  Allergies   Allergen Reactions   • Egg White [Albumen, Egg] Anaphylaxis   • Erythromycin Diarrhea, Nausea And Vomiting and GI Intolerance   • Lactose Intolerance (Gi) Diarrhea   • Meperidine Other (See Comments)     Bradycardia & lightheadedness   • Orange Juice Diarrhea     Review of Systems  She has weakness fatigue all other systems reviewed and negative     Objective     Vital Signs  Temp:  [97.7 °F (36.5 °C)-99.3 °F (37.4 °C)] 98.2 °F (36.8 °C)  Heart Rate:  [] 91  Resp:  [16-21] 16  BP: ()/() 99/51  Physical Exam:  General Appearance:    Alert, cooperative, in no acute distress   Head:    Normocephalic, without obvious abnormality, atraumatic   Eyes:          conjunctivae and sclerae normal, no   icterus   Throat:   no thrush, oral mucosa moist   Neck:   Supple, no adenopathy   Lungs:     Clear to auscultation bilaterally    Heart:    Regular rhythm and normal rate    Chest Wall:    No abnormalities observed   Abdomen:     Soft, nondistended, nontender; normal bowel sounds   Extremities:   no edema, no redness   Skin:   No bruising or rash   Psychiatric:  normal mood and insight     Results Review:   I reviewed the patient's new clinical results.    Results from last 7 days   Lab  Units 10/05/19  1821   WBC 10*3/mm3 4.41   HEMOGLOBIN g/dL 13.4   HEMATOCRIT % 41.9   PLATELETS 10*3/mm3 209     Results from last 7 days   Lab Units 10/06/19  0319 10/05/19  1821   SODIUM mmol/L 138 137   POTASSIUM mmol/L 3.8 4.0   CHLORIDE mmol/L 104 102   CO2 mmol/L 18.2* 20.2*   BUN mg/dL 10 8   CREATININE mg/dL 1.19* 1.29*   CALCIUM mg/dL 7.5* 8.9   BILIRUBIN mg/dL 1.1 0.7   ALK PHOS U/L 115 148*   ALT (SGPT) U/L 362* 431*   AST (SGOT) U/L 422* 620*   GLUCOSE mg/dL 104* 108*     Results from last 7 days   Lab Units 10/05/19  2050   INR  0.99     Lab Results   Lab Value Date/Time    LIPASE 20 10/05/2019 1821       Radiology:  CT Abdomen Pelvis Without Contrast   Final Result   1.  Fatty liver with 14 mm low-density right hepatic lobe lesion.   Follow-up recommended.   2. 2.9 cm parapelvic left renal cyst.   3. Mild diverticulosis.   4. Probable collection of tiny stones in the distal right ureter as   discussed. No significant hydronephrosis                   This report was finalized on 10/5/2019 11:00 PM by Felipe Jacobsen M.D.              Assessment/Plan   Patient Active Problem List   Diagnosis   • Left upper quadrant pain   • Hepatitis   • LFT elevation   • UTI (urinary tract infection), bacterial   • Hypotension   • Tachycardia       Assessment:  1. Elevated liver tests  2. Tylenol usage    Plan:  · I will recheck liver test this afternoon if they continue to improve and INR is normal we can hold on any further treatment  · Supportive care  · If the numbers are worsening I am reinitiating N-acetylcysteine      I discussed the patients findings and my recommendations with patient and nursing staff.    Griffin Mckeon MD            Electronically signed by Griffin Mckeon MD at 10/06/19 8053     Satish Sibley Jr., MD at 10/07/19 1315      Consult Orders    1. Inpatient Urology Consult [[id number]] ordered by Felipe Paez MD at 10/07/19 1145                       FIRST UROLOGY  CONSULT      Patient Identification:  NAME:  Val Pettit  Age:  49 y.o.   Sex:  female   :  1970   MRN:  [id number]       Chief complaint: Right flank pain    History of present illness:  This is a 49 year old female who reports a 3-week history of intermittent right flank pain. Sharp, severe. Associated with urgency and frequency. She presented to urgent care, where microscopic hematuria was found. She was treated for a presumed UTI. Culture was ultimately negative. She developed nausea, vomiting with no improvement in the pain. She presented to the ER for further management. CT abd/pelvis demonstrates a cluster of stones in the right distal ureter, largest 4mm. Minimal hydronephrosis.      Past medical history:  Past Medical History:   Diagnosis Date   • Arthritis    • GERD (gastroesophageal reflux disease)    • History of transfusion    • Hypotension    • IBS (irritable bowel syndrome)    • PONV (postoperative nausea and vomiting)        Past surgical history:  Past Surgical History:   Procedure Laterality Date   • ANKLE SURGERY Right    • BACK SURGERY      herniated disc removal   •  SECTION     • CHOLECYSTECTOMY     • COLONOSCOPY     • ENDOSCOPY     • JOINT REPLACEMENT     • KNEE SURGERY Bilateral     x3 each   • SHOULDER SURGERY Left     x2   • SKIN BIOPSY         Allergies:  Egg white [albumen, egg]; Erythromycin; Lactose intolerance (gi); Meperidine; and Temple juice    Home medications:  Medications Prior to Admission   Medication Sig Dispense Refill Last Dose   • Acetaminophen (ACETAMIN PO) Take 650 mg by mouth Every 6 (Six) Hours As Needed (for headaches and  kidney stone pain).      • diphenhydrAMINE (BENADRYL) 50 MG capsule Take 50 mg by mouth At Night As Needed for Allergies or Sleep.      • phenazopyridine (PYRIDIUM) 200 MG tablet Take 200 mg by mouth 3 (Three) Times a Day. For bladder spasms x 5 days (started 10/1/19)      • sulfamethoxazole-trimethoprim (BACTRIM DS,SEPTRA DS)  800-160 MG per tablet Take 1 tablet by mouth 2 (Two) Times a Day. For UTI x 10 days (started 10/1/19)           Hospital medications:    ceftriaxone 1 g Intravenous Q24H   famotidine 20 mg Intravenous Q12H   sodium chloride 10 mL Intravenous Q12H       sodium chloride 125 mL/hr Last Rate: 125 mL/hr (10/07/19 0827)     Family history:  History reviewed. No pertinent family history.    Social history:  Social History     Tobacco Use   • Smoking status: Never Smoker   • Smokeless tobacco: Never Used   Substance Use Topics   • Alcohol use: No     Frequency: Never   • Drug use: No       Review of systems:      Positive for:  As per HPI  Negative for:  Fevers, chills, blurry vision, headaches, sore throat, hearing loss, enlarged cervical lymph nodes, chest pain, shortness of breath, palpitations, wheezing, diarrhea, constipation, hematochezia, depressed mood, anxiety, rash, joint pain, weakness, numbness.    Objective:  TMax 24 hours:   Temp (24hrs), Av.9 °F (36.6 °C), Min:97.8 °F (36.6 °C), Max:98.1 °F (36.7 °C)      Vitals Ranges:   Temp:  [97.8 °F (36.6 °C)-98.1 °F (36.7 °C)] 98.1 °F (36.7 °C)  Heart Rate:  [79-91] 86  Resp:  [16-18] 18  BP: ()/(51-69) 112/67    Intake/Output Last 3 shifts:  I/O last 3 completed shifts:  In: 4011.7 [P.O.:120; I.V.:2791.7; IV Piggyback:1100]  Out: 1000 [Urine:1000]     Physical Exam:    General Appearance:    Alert, cooperative, NAD   HEENT:    No trauma, pupils reactive, hearing intact   Back:     No CVA tenderness   Lungs:     Respirations unlabored, no wheezing    Heart:    RRR, intact peripheral pulses   Abdomen:     Soft, NDNT, no masses, no guarding   :    Pelvic not performed, bladder non distended and non tender   Extremities:   No edema, no deformity   Lymphatic:   No neck or groin LAD   Skin:   No bleeding, bruising or rashes   Neuro/Psych:   Orientation intact, mood/affect pleasant, no focal findings       Results review:   I reviewed the patient's new clinical  results.    Data review:  Lab Results (last 24 hours)     Procedure Component Value Units Date/Time    CBC & Differential [[id number]] Collected:  10/07/19 0309    Specimen:  Blood Updated:  10/07/19 0425    Narrative:       The following orders were created for panel order CBC & Differential.  Procedure                               Abnormality         Status                     ---------                               -----------         ------                     CBC Auto Differential[[id number]]        Abnormal            Final result                 Please view results for these tests on the individual orders.    CBC Auto Differential [[id number]]  (Abnormal) Collected:  10/07/19 0309    Specimen:  Blood Updated:  10/07/19 0425     WBC 5.58 10*3/mm3      RBC 3.62 10*6/mm3      Hemoglobin 9.9 g/dL      Hematocrit 30.9 %      MCV 85.4 fL      MCH 27.3 pg      MCHC 32.0 g/dL      RDW 13.9 %      RDW-SD 43.6 fl      MPV 10.1 fL      Platelets 166 10*3/mm3     Manual Differential [[id number]]  (Abnormal) Collected:  10/07/19 0309    Specimen:  Blood Updated:  10/07/19 0425     Neutrophil % 58.8 %      Lymphocyte % 32.0 %      Monocyte % 3.1 %      Eosinophil % 5.2 %      Atypical Lymphocyte % 1.0 %      Neutrophils Absolute 3.28 10*3/mm3      Lymphocytes Absolute 1.79 10*3/mm3      Monocytes Absolute 0.17 10*3/mm3      Eosinophils Absolute 0.29 10*3/mm3      RBC Morphology Normal     WBC Morphology Normal     Giant Platelets Large/3+    Comprehensive Metabolic Panel [[id number]]  (Abnormal) Collected:  10/07/19 0309    Specimen:  Blood Updated:  10/07/19 0424     Glucose 89 mg/dL      BUN 7 mg/dL      Creatinine 1.01 mg/dL      Sodium 141 mmol/L      Potassium 4.3 mmol/L      Chloride 108 mmol/L      CO2 23.8 mmol/L      Calcium 7.8 mg/dL      Total Protein 5.4 g/dL      Albumin 3.30 g/dL      ALT (SGPT) 307 U/L      AST (SGOT) 256 U/L      Alkaline Phosphatase 115 U/L      Total Bilirubin 0.5 mg/dL      eGFR Non   Amer 59 mL/min/1.73      Globulin 2.1 gm/dL      A/G Ratio 1.6 g/dL      BUN/Creatinine Ratio 6.9     Anion Gap 9.2 mmol/L     Narrative:       GFR Normal >60  Chronic Kidney Disease <60  Kidney Failure <15    Protime-INR [[id number]]  (Normal) Collected:  10/07/19 0309    Specimen:  Blood Updated:  10/07/19 0408     Protime 12.7 Seconds      INR 0.98    CBC (No Diff) [[id number]]  (Abnormal) Collected:  10/06/19 1716    Specimen:  Blood from Arm, Left Updated:  10/06/19 1734     WBC 5.53 10*3/mm3      RBC 4.14 10*6/mm3      Hemoglobin 11.2 g/dL      Hematocrit 34.9 %      MCV 84.3 fL      MCH 27.1 pg      MCHC 32.1 g/dL      RDW 14.0 %      RDW-SD 43.5 fl      MPV 9.5 fL      Platelets 168 10*3/mm3     Comprehensive Metabolic Panel [[id number]]  (Abnormal) Collected:  10/06/19 1606    Specimen:  Blood from Arm, Left Updated:  10/06/19 1646     Glucose 90 mg/dL      BUN 8 mg/dL      Creatinine 0.97 mg/dL      Sodium 140 mmol/L      Potassium 4.1 mmol/L      Chloride 108 mmol/L      CO2 22.9 mmol/L      Calcium 7.6 mg/dL      Total Protein 5.7 g/dL      Albumin 3.50 g/dL      ALT (SGPT) 356 U/L      AST (SGOT) 397 U/L      Alkaline Phosphatase 121 U/L      Total Bilirubin 0.8 mg/dL      eGFR Non African Amer 61 mL/min/1.73      Globulin 2.2 gm/dL      A/G Ratio 1.6 g/dL      BUN/Creatinine Ratio 8.2     Anion Gap 9.1 mmol/L     Narrative:       GFR Normal >60  Chronic Kidney Disease <60  Kidney Failure <15    Protime-INR [[id number]]  (Normal) Collected:  10/06/19 1607    Specimen:  Blood from Arm, Left Updated:  10/06/19 1627     Protime 13.0 Seconds      INR 1.01           Imaging:  Imaging Results (last 24 hours)     ** No results found for the last 24 hours. **             Assessment:       Hepatitis    Left upper quadrant pain    LFT elevation    UTI (urinary tract infection), bacterial    Hypotension    Tachycardia    Right ureteral stone    Plan:     - discussed options including trial of passage, ureteroscopy and  laser lithotripsy, ESWL. After weighing the risks, she would like to proceed with ureteroscopy. We will schedule the procedure, likely on Wednesday. She is safe for discharge on pain/nausea meds and antibiotics in the interim. Strain all urine.    Satish Sibley Jr., MD  10/07/19  1:16 PM        Electronically signed by Satish Sibley Jr., MD at 10/07/19 1323          Discharge Summary      Kody Kenney MD at 10/10/19 9145                              [facility]    Date of Discharge:  10/10/2019    PCP: Xu Kyle MD    Discharge Diagnosis:   Active Hospital Problems    Diagnosis  POA   • **Hepatitis [K75.9]  Unknown   • LFT elevation [R94.5]  Unknown   • UTI (urinary tract infection), bacterial [N39.0, A49.9]  Unknown   • Hypotension [I95.9]  Unknown   • Tachycardia [R00.0]  Unknown   • Left upper quadrant pain [R10.12]  Yes      Resolved Hospital Problems   No resolved problems to display.     Procedures Performed  Procedure(s):  RIGHT URETEROSCOPY     Consults     Date and Time Order Name Status Description    10/7/2019 1145 Inpatient Urology Consult Completed     10/6/2019 0015 Inpatient Gastroenterology Consult Completed     10/5/2019 2022 [facility] (on-call MD unless specified) Details Completed         Hospital Course  Please see history and physical for details. Patient is a 49 y.o. female admitted with acute hepatitis and GI saw the patient.  There was some concern for some excess Tylenol use however she had no indication for NAC.  They also consider the possibility of DILI from recent Bactrim use as she did have slight eosinophilia on admission.  With supportive care liver function tests improved and GI recommends checking them within a week.  They also plan to do an MRI to follow-up the right liver lobe lesion.  They will see her in the office and arrange for an MRI at that time.  I discussed this with the patient and family x1 and they are  aware.    Results from last 7 days   Lab Units 10/10/19  0324 10/09/19  0323 10/07/19  0309   ALK PHOS U/L 113 114  117 115   BILIRUBIN mg/dL 0.4 0.4  0.3 0.5   BILIRUBIN DIRECT mg/dL  --  0.2  --    ALT (SGPT) U/L 152* 188*  189* 307*   AST (SGOT) U/L 56* 84*  84* 256*     She also was found to have on CT scan probable collection of tiny stones in the distal right ureter and urology recommended proceeding with ureteroscopy.  She passed the stone prior to the procedure.  She had right ureteroscopy on 10/9/2019 and no stone was identified.    Overall she is feeling much improved and would like to go home today.  Pain is improved.  She is requesting some pain and nausea medication PRN.    I discussed the patient's findings and my recommendations with patient, family and nursing staff.    Condition on Discharge: Improved.     Temp:  [97.7 °F (36.5 °C)-98.4 °F (36.9 °C)] 97.9 °F (36.6 °C)  Heart Rate:  [65-94] 85  Resp:  [9-18] 18  BP: (114-149)/(69-97) 137/82  Body mass index is 39.33 kg/m².    Physical Exam   Constitutional: She is oriented to person, place, and time. No distress.   Cardiovascular: Normal rate and regular rhythm.   Pulmonary/Chest: Effort normal and breath sounds normal. No respiratory distress.   Abdominal: Soft. Bowel sounds are normal. There is no tenderness. There is no rebound and no guarding.   Musculoskeletal: She exhibits no edema.   Neurological: She is alert and oriented to person, place, and time.   Skin: Skin is warm and dry.   Psychiatric: She has a normal mood and affect. Her behavior is normal.   Nursing note and vitals reviewed.       Discharge Medications      New Medications      Instructions Start Date   cephalexin 500 MG capsule  Commonly known as:  KEFLEX   500 mg, Oral, 2 Times Daily      ondansetron 4 MG tablet  Commonly known as:  ZOFRAN   4 mg, Oral, Every 8 Hours PRN      oxyCODONE 5 MG immediate release tablet  Commonly known as:  ROXICODONE   5 mg, Oral, Every 4 Hours  PRN      polyethylene glycol pack packet  Commonly known as:  MIRALAX   17 g, Oral, Daily, while on pain medications   Start Date:  10/11/2019        Continue These Medications      Instructions Start Date   diphenhydrAMINE 50 MG capsule  Commonly known as:  BENADRYL   50 mg, Oral, Nightly PRN      phenazopyridine 200 MG tablet  Commonly known as:  PYRIDIUM   200 mg, Oral, 3 Times Daily, For bladder spasms x 5 days (started 10/1/19)         Stop These Medications    ACETAMIN PO     sulfamethoxazole-trimethoprim 800-160 MG per tablet  Commonly known as:  BACTRIM DS,SEPTRA DS           Diet Instructions     Diet: Regular      Discharge Diet:  Regular         Activity Instructions     Activity as Tolerated           Additional Instructions for the Follow-ups that You Need to Schedule     Call MD for problems / concerns.   As directed      Comprehensive Metabolic Panel    Oct 17, 2019 (Approximate)        Follow-up Information     Xu Kyle MD Follow up.    Specialty:  Adolescent Medicine  Contact information:  [address] [facility] [address] [facility] [address]  [facility] [address] [phone]             Satish Sibley Jr., MD Follow up.    Specialty:  Urology  Contact information:  [address] [place] [address] [phone]             Vikki Puentes MD Follow up.    Specialty:  Gastroenterology  Contact information:  [address] [facility] [address]  [facility] [id number] [phone]                 Test Results Pending at Discharge   Order Current Status    Stone Analysis - Calculus, Ureter, Right In process         Kody Kenney MD  10/10/19  9:35 AM    Discharge time spent greater than 30 minutes.      Electronically signed by Kody Kenney MD at 10/10/19 4499

## 2019-10-10 NOTE — DISCHARGE SUMMARY
[facility]    Date of Discharge:  10/10/2019    PCP: Xu Kyle MD    Discharge Diagnosis:   Active Hospital Problems    Diagnosis  POA   • **Hepatitis [K75.9]  Unknown   • LFT elevation [R94.5]  Unknown   • UTI (urinary tract infection), bacterial [N39.0, A49.9]  Unknown   • Hypotension [I95.9]  Unknown   • Tachycardia [R00.0]  Unknown   • Left upper quadrant pain [R10.12]  Yes      Resolved Hospital Problems   No resolved problems to display.     Procedures Performed  Procedure(s):  RIGHT URETEROSCOPY     Consults     Date and Time Order Name Status Description    10/7/2019 1145 Inpatient Urology Consult Completed     10/6/2019 0015 Inpatient Gastroenterology Consult Completed     10/5/2019 2022 [facility] (on-call MD unless specified) Details Completed         Hospital Course  Please see history and physical for details. Patient is a 49 y.o. female admitted with acute hepatitis and GI saw the patient.  There was some concern for some excess Tylenol use however she had no indication for NAC.  They also consider the possibility of DILI from recent Bactrim use as she did have slight eosinophilia on admission.  With supportive care liver function tests improved and GI recommends checking them within a week.  They also plan to do an MRI to follow-up the right liver lobe lesion.  They will see her in the office and arrange for an MRI at that time.  I discussed this with the patient and family x1 and they are aware.    Results from last 7 days   Lab Units 10/10/19  0324 10/09/19  0323 10/07/19  0309   ALK PHOS U/L 113 114  117 115   BILIRUBIN mg/dL 0.4 0.4  0.3 0.5   BILIRUBIN DIRECT mg/dL  --  0.2  --    ALT (SGPT) U/L 152* 188*  189* 307*   AST (SGOT) U/L 56* 84*  84* 256*     She also was found to have on CT scan probable collection of tiny stones in the distal right ureter and urology recommended proceeding with ureteroscopy.  She passed the stone prior  to the procedure.  She had right ureteroscopy on 10/9/2019 and no stone was identified.    Overall she is feeling much improved and would like to go home today.  Pain is improved.  She is requesting some pain and nausea medication PRN.    I discussed the patient's findings and my recommendations with patient, family and nursing staff.    Condition on Discharge: Improved.     Temp:  [97.7 °F (36.5 °C)-98.4 °F (36.9 °C)] 97.9 °F (36.6 °C)  Heart Rate:  [65-94] 85  Resp:  [9-18] 18  BP: (114-149)/(69-97) 137/82  Body mass index is 39.33 kg/m².    Physical Exam   Constitutional: She is oriented to person, place, and time. No distress.   Cardiovascular: Normal rate and regular rhythm.   Pulmonary/Chest: Effort normal and breath sounds normal. No respiratory distress.   Abdominal: Soft. Bowel sounds are normal. There is no tenderness. There is no rebound and no guarding.   Musculoskeletal: She exhibits no edema.   Neurological: She is alert and oriented to person, place, and time.   Skin: Skin is warm and dry.   Psychiatric: She has a normal mood and affect. Her behavior is normal.   Nursing note and vitals reviewed.       Discharge Medications      New Medications      Instructions Start Date   cephalexin 500 MG capsule  Commonly known as:  KEFLEX   500 mg, Oral, 2 Times Daily      ondansetron 4 MG tablet  Commonly known as:  ZOFRAN   4 mg, Oral, Every 8 Hours PRN      oxyCODONE 5 MG immediate release tablet  Commonly known as:  ROXICODONE   5 mg, Oral, Every 4 Hours PRN      polyethylene glycol pack packet  Commonly known as:  MIRALAX   17 g, Oral, Daily, while on pain medications   Start Date:  10/11/2019        Continue These Medications      Instructions Start Date   diphenhydrAMINE 50 MG capsule  Commonly known as:  BENADRYL   50 mg, Oral, Nightly PRN      phenazopyridine 200 MG tablet  Commonly known as:  PYRIDIUM   200 mg, Oral, 3 Times Daily, For bladder spasms x 5 days (started 10/1/19)         Stop These  Medications    ACETAMIN PO     sulfamethoxazole-trimethoprim 800-160 MG per tablet  Commonly known as:  BACTRIM DS,SEPTRA DS           Diet Instructions     Diet: Regular      Discharge Diet:  Regular         Activity Instructions     Activity as Tolerated           Additional Instructions for the Follow-ups that You Need to Schedule     Call MD for problems / concerns.   As directed      Comprehensive Metabolic Panel    Oct 17, 2019 (Approximate)        Follow-up Information     Xu Kyle MD Follow up.    Specialty:  Adolescent Medicine  Contact information:  [address] [facility] [address] [facility] [address]  [facility] [address]  [phone]             aStish Sibley Jr., MD Follow up.    Specialty:  Urology  Contact information:  [address] [place] [address] [place] [address] [phone]             Vikki Puentes MD Follow up.    Specialty:  Gastroenterology  Contact information:  [address] [facility] [address]  [facility] [address] [phone]                 Test Results Pending at Discharge   Order Current Status    Stone Analysis - Calculus, Ureter, Right In process         Kody Kenney MD  10/10/19  9:35 AM    Discharge time spent greater than 30 minutes.

## 2019-10-10 NOTE — PROGRESS NOTES
[facility]  Inpatient Progress Note    Reason for Follow Up:  Elevated liver enzymes    Subjective     Interval History:   Feeling better today following lithotripsy.  Only a little sore in her abdomen but not severe.  Tolerating diet    Current Facility-Administered Medications:   •  diphenhydrAMINE (BENADRYL) capsule 50 mg, 50 mg, Oral, Nightly PRN, Anthony Corbin MD, 50 mg at 10/09/19 2255  •  famotidine (PEPCID) injection 20 mg, 20 mg, Intravenous, Q12H, Anthony Corbin MD, 20 mg at 10/10/19 0030  •  HYDROmorphone (DILAUDID) injection 0.5 mg, 0.5 mg, Intravenous, Q2H PRN, Kody Kenney MD, 0.5 mg at 10/09/19 2020  •  ondansetron (ZOFRAN) tablet 4 mg, 4 mg, Oral, Q6H PRN **OR** ondansetron (ZOFRAN) injection 4 mg, 4 mg, Intravenous, Q6H PRN, Anthony Corbin MD, 4 mg at 10/09/19 1630  •  oxyCODONE (ROXICODONE) immediate release tablet 5 mg, 5 mg, Oral, Q4H PRN, Felipe Paez MD, 5 mg at 10/09/19 2254  •  phenazopyridine (PYRIDIUM) tablet 200 mg, 200 mg, Oral, TID PRN, Felipe Paez MD, 200 mg at 10/10/19 0031  •  polyethylene glycol 3350 powder (packet), 17 g, Oral, Daily, Stingl, Lorna Chino MD, Stopped at 10/09/19 0834  •  Scopolamine (TRANSDERM-SCOP) 1.5 MG/3DAYS patch 1 patch, 1 patch, Transdermal, Q72H, Rolando Maier MD, 1 patch at 10/09/19 1110  •  [COMPLETED] Insert peripheral IV, , , Once **AND** sodium chloride 0.9 % flush 10 mL, 10 mL, Intravenous, PRN, Rolando Lemus MD, 10 mL at 10/06/19 0237  •  sodium chloride 0.9 % flush 10 mL, 10 mL, Intravenous, Q12H, Anthony Corbin MD, 10 mL at 10/09/19 2021  •  sodium chloride 0.9 % flush 10 mL, 10 mL, Intravenous, PRN, Anthony Corbin MD  •  sodium chloride 0.9 % infusion, 125 mL/hr, Intravenous, Continuous, Anthony Corbin MD, Last Rate: 125 mL/hr at 10/09/19 1630, 125 mL/hr at 10/09/19 1630  Review of Systems:   All systems reviewed and negative except for: GI: Upper abdominal  pain, mild    Objective     Vital Signs  Temp:  [97.7 °F (36.5 °C)-98.4 °F (36.9 °C)] 97.9 °F (36.6 °C)  Heart Rate:  [65-94] 85  Resp:  [9-18] 18  BP: (114-149)/(69-97) 137/82  Body mass index is 39.33 kg/m².    Intake/Output Summary (Last 24 hours) at 10/10/2019 0832  Last data filed at 10/10/2019 0735  Gross per 24 hour   Intake 1870 ml   Output 2755 ml   Net -885 ml     I/O this shift:  In: -   Out: 400 [Urine:400]     Physical Exam:   General: patient awake, alert and cooperative, nondistressed   Eyes: Normal lids and lashes, no scleral icterus   Neck: supple, normal ROM   Skin: warm and dry, not jaundiced   Cardiovascular: regular rhythm and rate, no murmurs auscultated   Pulm: clear to auscultation bilaterally, regular and unlabored   Abdomen: soft, obese, nontender, nondistended; normal bowel sounds   Rectal: deferred   Extremities: no rash or edema   Psychiatric: Normal mood and behavior; memory intact     Results Review:     I reviewed the patient's new clinical results.    Results from last 7 days   Lab Units 10/09/19  0323 10/07/19  0309 10/06/19  1716   WBC 10*3/mm3 6.99 5.58 5.53   HEMOGLOBIN g/dL 9.9* 9.9* 11.2*   HEMATOCRIT % 31.7* 30.9* 34.9   PLATELETS 10*3/mm3 208 166 168     Results from last 7 days   Lab Units 10/10/19  0324 10/09/19  0323 10/07/19  0309   SODIUM mmol/L 141 144  142 141   POTASSIUM mmol/L 4.5 4.5  4.4 4.3   CHLORIDE mmol/L 105 107  106 108*   CO2 mmol/L 26.6 24.0  25.6 23.8   BUN mg/dL 9 4*  4* 7   CREATININE mg/dL 0.83 0.84  0.86 1.01*   CALCIUM mg/dL 8.3* 8.1*  8.0* 7.8*   BILIRUBIN mg/dL 0.4 0.4  0.3 0.5   ALK PHOS U/L 113 114  117 115   ALT (SGPT) U/L 152* 188*  189* 307*   AST (SGOT) U/L 56* 84*  84* 256*   GLUCOSE mg/dL 119* 85  87 89     Results from last 7 days   Lab Units 10/07/19  0309 10/06/19  1607 10/05/19  2050   INR  0.98 1.01 0.99     Lab Results   Lab Value Date/Time    LIPASE 20 10/05/2019 1821       Radiology:  CT Abdomen Pelvis Without Contrast    Final Result   1.  Fatty liver with 14 mm low-density right hepatic lobe lesion.   Follow-up recommended.   2. 2.9 cm parapelvic left renal cyst.   3. Mild diverticulosis.   4. Probable collection of tiny stones in the distal right ureter as   discussed. No significant hydronephrosis                   This report was finalized on 10/5/2019 11:00 PM by Felipe Jacobsen M.D.              Assessment/Plan     Patient Active Problem List   Diagnosis   • Left upper quadrant pain   • Hepatitis   • LFT elevation   • UTI (urinary tract infection), bacterial   • Hypotension   • Tachycardia       Impression  1. Elevated liver enzymes: Continued improvement    2. Recent bactrim and tylenol use: possible cause to #1    3. R liver lobe lesion: 14mm, unclear etiology    4. Hepatic steatosis    Plan  Anticipate continued improvement in her transaminases  Okay for discharge from GI standpoint  Recommend repeat CMP with her PCP in 1 week  Will arrange for outpatient hospital follow-up to follow the right liver lobe lesion-- we will get an MRI at that time      I discussed the patients findings and my recommendations with patient.    Vikki Puentes MD

## 2019-10-10 NOTE — PLAN OF CARE
Problem: Patient Care Overview  Goal: Plan of Care Review  Outcome: Ongoing (interventions implemented as appropriate)   10/10/19 1030   Coping/Psychosocial   Plan of Care Reviewed With patient   Plan of Care Review   Progress improving   OTHER   Outcome Summary pt vss, pain managed with roxicodone q4. resting well. discharge this am. will continue to monitor.      Goal: Individualization and Mutuality  Outcome: Ongoing (interventions implemented as appropriate)    Goal: Discharge Needs Assessment  Outcome: Ongoing (interventions implemented as appropriate)      Problem: Pain, Chronic (Adult)  Goal: Identify Related Risk Factors and Signs and Symptoms  Outcome: Outcome(s) achieved Date Met: 10/10/19    Goal: Acceptable Pain/Comfort Level and Functional Ability  Outcome: Ongoing (interventions implemented as appropriate)

## 2019-10-11 ENCOUNTER — READMISSION MANAGEMENT (OUTPATIENT)
Dept: CALL CENTER | Facility: HOSPITAL | Age: 49
End: 2019-10-11

## 2019-10-11 NOTE — OUTREACH NOTE
Prep Survey      Responses   Facility patient discharged from?  [facility]   Is patient eligible?  Yes   Discharge diagnosis  Hepatitis/UTI   Does the patient have one of the following disease processes/diagnoses(primary or secondary)?  Other   Does the patient have Home health ordered?  No   Is there a DME ordered?  No   Prep survey completed?  Yes          Veronica Bishop RN

## 2019-10-15 ENCOUNTER — READMISSION MANAGEMENT (OUTPATIENT)
Dept: CALL CENTER | Facility: HOSPITAL | Age: 49
End: 2019-10-15

## 2019-10-15 NOTE — OUTREACH NOTE
Medical Week 1 Survey      Responses   Facility patient discharged from?  [place]   Does the patient have one of the following disease processes/diagnoses(primary or secondary)?  Other   Is there a successful TCM telephone encounter documented?  No   Week 1 attempt successful?  No   Unsuccessful attempts  Attempt 1          Cari Guadarrama RN

## 2019-10-17 ENCOUNTER — READMISSION MANAGEMENT (OUTPATIENT)
Dept: CALL CENTER | Facility: HOSPITAL | Age: 49
End: 2019-10-17

## 2019-10-17 NOTE — OUTREACH NOTE
Medical Week 1 Survey      Responses   Facility patient discharged from?  [place]   Does the patient have one of the following disease processes/diagnoses(primary or secondary)?  Other   Is there a successful TCM telephone encounter documented?  No   Week 1 attempt successful?  Yes   Call start time  1410   Call end time  1413   Discharge diagnosis  Hepatitis/UTI   Is patient permission given to speak with other caregiver?  Yes   List who call center can speak with  Aidan, roselia   Meds reviewed with patient/caregiver?  Yes   Is the patient having any side effects they believe may be caused by any medication additions or changes?  No   Does the patient have all medications ordered at discharge?  Yes   Is the patient taking all medications as directed (includes completed medication regime)?  Yes   Does the patient have a primary care provider?   Yes   Does the patient have an appointment with their PCP within 7 days of discharge?  Yes   Has the patient kept scheduled appointments due by today?  N/A   Has home health visited the patient within 72 hours of discharge?  N/A   Psychosocial issues?  No   Did the patient receive a copy of their discharge instructions?  Yes   Nursing interventions  Reviewed instructions with patient, Educated on MyChart   What is the patient's perception of their health status since discharge?  Improving   Is the patient/caregiver able to teach back signs and symptoms related to disease process for when to call PCP?  Yes   Is the patient/caregiver able to teach back signs and symptoms related to disease process for when to call 911?  Yes   Is the patient/caregiver able to teach back the hierarchy of who to call/visit for symptoms/problems? PCP, Specialist, Home health nurse, Urgent Care, ED, 911  Yes   Week 1 call completed?  Yes          Dorothea Vicente RN

## 2019-10-18 LAB
COLOR STONE: NORMAL
COM CRY STONE QL IR: 3 %
COMPN STONE: NORMAL
CONV COMMENT: NORMAL
Lab: NORMAL
Lab: NORMAL
NIDUS STONE QL: NORMAL
PATH REPORT.COMMENTS IMP SPEC: NORMAL
SIZE STONE: NORMAL MM
URATE MFR STONE: 97 %
WT STONE: 67.1 MG

## 2019-10-22 ENCOUNTER — OFFICE VISIT (OUTPATIENT)
Dept: GASTROENTEROLOGY | Facility: CLINIC | Age: 49
End: 2019-10-22

## 2019-10-22 VITALS
WEIGHT: 221 LBS | SYSTOLIC BLOOD PRESSURE: 134 MMHG | HEIGHT: 65 IN | TEMPERATURE: 98.1 F | DIASTOLIC BLOOD PRESSURE: 86 MMHG | BODY MASS INDEX: 36.82 KG/M2

## 2019-10-22 DIAGNOSIS — K75.9 HEPATITIS: Primary | ICD-10-CM

## 2019-10-22 DIAGNOSIS — K76.9 LIVER LESION, RIGHT LOBE: ICD-10-CM

## 2019-10-22 DIAGNOSIS — R79.89 LFT ELEVATION: ICD-10-CM

## 2019-10-22 PROCEDURE — 99214 OFFICE O/P EST MOD 30 MIN: CPT | Performed by: NURSE PRACTITIONER

## 2019-10-22 NOTE — PROGRESS NOTES
Chief Complaint   Patient presents with   • ABN LFT's       Val Pettit is a  49 y.o. female here for a hospital follow up visit for hepatitis.    HPI  49-year-old female presents today for hospital follow-up visit for acute hepatitis with abnormal CT scan of the liver.  She is a patient of Dr. Mckeon.  She was seen by our service at [facility] on 10/6/2019 for elevated liver enzymes.  She had a CT scan of the abdomen and pelvis that showed:    IMPRESSION:  1.  Fatty liver with 14 mm low-density right hepatic lobe lesion.  Follow-up recommended.  2. 2.9 cm parapelvic left renal cyst.  3. Mild diverticulosis.  4. Probable collection of tiny stones in the distal right ureter as  discussed. No significant hydronephrosis.      She had recently undergone lithotripsy for renal stones and was taking lots of Tylenol for the pain.  She was also started on Bactrim at the same time for UTI.  Labs on admission showed:Alkaline phosphatase of 148, ,  and total bilirubin 1.1.  She was diagnosed with acute hepatitis and found to have a 14 mm liver lesion in the right hepatic lobe. she was treated conservatively in the hospital and her LFTs did start to trend down.  She was discharged home.  Since being home she continues to have some right-sided abdominal and flank pain.  She is avoiding Tylenol and NSAIDs now.  She does tell me she is finally starting to feel better but she is still somewhat fatigued.  She denies any dysphagia, abdominal pain, nausea vomiting, diarrhea, constipation, rectal bleeding or melena.  She was her appetite is good and her weight has dropped from 232 pounds on 10/5/2019 to 221 pounds today.       Past Medical History:   Diagnosis Date   • Arthritis    • GERD (gastroesophageal reflux disease)    • History of transfusion    • Hypotension    • IBS (irritable bowel syndrome)    • PONV (postoperative nausea and vomiting)        Past Surgical History:   Procedure  Laterality Date   • ANKLE SURGERY Right    • BACK SURGERY      herniated disc removal   •  SECTION     • CHOLECYSTECTOMY     • COLONOSCOPY     • ENDOSCOPY     • JOINT REPLACEMENT     • KNEE SURGERY Bilateral     x3 each   • SHOULDER SURGERY Left     x2   • SKIN BIOPSY     • URETEROSCOPY LASER LITHOTRIPSY WITH STENT INSERTION Right 10/9/2019    Procedure: RIGHT URETEROSCOPY;  Surgeon: Satish Sibley Jr., MD;  Location: [facility] OR;  Service: Urology       Scheduled Meds:    Continuous Infusions:  No current facility-administered medications for this visit.     PRN Meds:.    Allergies   Allergen Reactions   • Egg White [Albumen, Egg] Anaphylaxis   • Meperidine Other (See Comments)     Bradycardia & lightheadedness   • Erythromycin Diarrhea, Nausea And Vomiting and GI Intolerance   • Lactose Intolerance (Gi) Diarrhea   • Orange Juice Diarrhea       Social History     Socioeconomic History   • Marital status:      Spouse name: Not on file   • Number of children: Not on file   • Years of education: Not on file   • Highest education level: Not on file   Tobacco Use   • Smoking status: Never Smoker   • Smokeless tobacco: Never Used   Substance and Sexual Activity   • Alcohol use: No     Frequency: Never   • Drug use: No   • Sexual activity: Yes     Comment: pt states no intercourse d/t tubes not tied       History reviewed. No pertinent family history.    Review of Systems   Constitutional: Positive for fatigue. Negative for appetite change, chills, diaphoresis, fever and unexpected weight change.   HENT: Negative for nosebleeds, postnasal drip, sore throat, trouble swallowing and voice change.    Respiratory: Negative for cough, choking, chest tightness, shortness of breath and wheezing.    Cardiovascular: Negative for chest pain, palpitations and leg swelling.   Gastrointestinal: Positive for abdominal pain. Negative for abdominal distention, anal bleeding, blood in stool, constipation, diarrhea,  nausea, rectal pain and vomiting.   Endocrine: Negative for polydipsia, polyphagia and polyuria.   Musculoskeletal: Negative for gait problem.   Skin: Negative for rash and wound.   Allergic/Immunologic: Negative for food allergies.   Neurological: Negative for dizziness, speech difficulty and light-headedness.   Psychiatric/Behavioral: Negative for confusion, self-injury, sleep disturbance and suicidal ideas.       Vitals:    10/22/19 1136   BP: 134/86   Temp: 98.1 °F (36.7 °C)       Physical Exam   Constitutional: She is oriented to person, place, and time. She appears well-developed and well-nourished. She does not appear ill. No distress.   HENT:   Head: Normocephalic.   Eyes: Pupils are equal, round, and reactive to light.   Cardiovascular: Normal rate, regular rhythm and normal heart sounds.   Pulmonary/Chest: Effort normal and breath sounds normal.   Abdominal: Soft. Bowel sounds are normal. She exhibits distension. She exhibits no mass. There is no hepatosplenomegaly. There is tenderness. There is no rebound and no guarding. No hernia.       Musculoskeletal: Normal range of motion.   Neurological: She is alert and oriented to person, place, and time.   Skin: Skin is warm and dry.   Psychiatric: She has a normal mood and affect. Her speech is normal and behavior is normal. Judgment normal.       No images are attached to the encounter.    Assessment and plan     1. Hepatitis  - Comprehensive Metabolic Panel  - MRI abdomen w wo contrast mrcp; Future    2. LFT elevation  - Comprehensive Metabolic Panel  - MRI abdomen w wo contrast mrcp; Future    3. Liver lesion, right lobe  - Comprehensive Metabolic Panel  - MRI abdomen w wo contrast mrcp; Future      Reviewed hospital records with her today.  Her most recent labs done on 10/10/2019 showed her LFTs were trending down. continue to avoid Tylenol as well as all anti-inflammatories.  Will repeat labs today.  Will also order MRI to get a better look at the liver  lesion.  Patient to follow-up with Kay in 2 to 3 weeks.  Patient to call the office with any issues.

## 2019-10-23 ENCOUNTER — TELEPHONE (OUTPATIENT)
Dept: GASTROENTEROLOGY | Facility: CLINIC | Age: 49
End: 2019-10-23

## 2019-10-23 LAB
ALBUMIN SERPL-MCNC: 4.5 G/DL (ref 3.5–5.2)
ALBUMIN/GLOB SERPL: 1.8 G/DL
ALP SERPL-CCNC: 99 U/L (ref 39–117)
ALT SERPL-CCNC: 50 U/L (ref 1–33)
AST SERPL-CCNC: 32 U/L (ref 1–32)
BILIRUB SERPL-MCNC: 0.3 MG/DL (ref 0.2–1.2)
BUN SERPL-MCNC: 8 MG/DL (ref 6–20)
BUN/CREAT SERPL: 6.9 (ref 7–25)
CALCIUM SERPL-MCNC: 9.5 MG/DL (ref 8.6–10.5)
CHLORIDE SERPL-SCNC: 103 MMOL/L (ref 98–107)
CO2 SERPL-SCNC: 24.7 MMOL/L (ref 22–29)
CREAT SERPL-MCNC: 1.16 MG/DL (ref 0.57–1)
GLOBULIN SER CALC-MCNC: 2.5 GM/DL
GLUCOSE SERPL-MCNC: 113 MG/DL (ref 65–99)
POTASSIUM SERPL-SCNC: 4 MMOL/L (ref 3.5–5.2)
PROT SERPL-MCNC: 7 G/DL (ref 6–8.5)
SODIUM SERPL-SCNC: 142 MMOL/L (ref 136–145)

## 2019-10-23 NOTE — TELEPHONE ENCOUNTER
----- Message from Cait Schwarz sent at 10/23/2019  1:07 PM EDT -----  Regarding: MRI   Contact: [phone]  PT IS ASKING FOR ANXIETY  MEDICATION DUE TO AN MRI

## 2019-10-28 NOTE — TELEPHONE ENCOUNTER
Per Dr. Mckeon: Unfortunately we are not allowed to write medications for anxiety, requires a work-up for controlled substances per the new [place] laws… She may have to get it from her primary care doctor or we may have to cancel the MRI unfortunately

## 2019-11-04 ENCOUNTER — APPOINTMENT (OUTPATIENT)
Dept: MRI IMAGING | Facility: HOSPITAL | Age: 49
End: 2019-11-04

## 2019-11-05 ENCOUNTER — HOSPITAL ENCOUNTER (OUTPATIENT)
Dept: MRI IMAGING | Facility: HOSPITAL | Age: 49
Discharge: HOME | End: 2019-11-05
Admitting: NURSE PRACTITIONER

## 2019-11-05 DIAGNOSIS — R79.89 LFT ELEVATION: ICD-10-CM

## 2019-11-05 DIAGNOSIS — K76.9 LIVER LESION, RIGHT LOBE: ICD-10-CM

## 2019-11-05 DIAGNOSIS — K75.9 HEPATITIS: ICD-10-CM

## 2019-11-05 PROCEDURE — 74183 MRI ABD W/O CNTR FLWD CNTR: CPT

## 2019-11-05 PROCEDURE — A9577 INJ MULTIHANCE: HCPCS | Performed by: NURSE PRACTITIONER

## 2019-11-05 PROCEDURE — 0 GADOBENATE DIMEGLUMINE 529 MG/ML SOLUTION: Performed by: NURSE PRACTITIONER

## 2019-11-05 RX ADMIN — GADOBENATE DIMEGLUMINE 20 ML: 529 INJECTION, SOLUTION INTRAVENOUS at 09:31

## 2019-11-08 ENCOUNTER — TELEPHONE (OUTPATIENT)
Dept: GASTROENTEROLOGY | Facility: CLINIC | Age: 49
End: 2019-11-08

## 2019-11-08 NOTE — TELEPHONE ENCOUNTER
----- Message -----  From: Scott Boyle RegSched Rep  Sent: 11/8/2019  10:43 AM  To: [facility]  Subject: ct results      Contact: [phone]                                   Pt calling about MRI

## 2019-11-11 ENCOUNTER — TELEPHONE (OUTPATIENT)
Dept: GASTROENTEROLOGY | Facility: CLINIC | Age: 49
End: 2019-11-11

## 2019-11-11 NOTE — TELEPHONE ENCOUNTER
----- Message from ZAID Martinez sent at 11/11/2019  9:02 AM EST -----  Impression    Small benign-appearing cyst in the right lobe of the liver  as described. Minimal hepatic steatosis. Otherwise unremarkable MRI of  the abdomen       Please call patient with results.  Looks like minimal fatty liver noted.  Otherwise looks okay.      --Called pt and advised of the above.  Pt verb understanding.  Margarita Mcdaniels RN

## 2019-11-11 NOTE — TELEPHONE ENCOUNTER
Please let her know that MRI shows a small benign-appearing liver cyst.  Minimal amount of fatty liver.  We can discuss further in follow-up tomorrow.

## 2019-11-12 ENCOUNTER — OFFICE VISIT (OUTPATIENT)
Dept: GASTROENTEROLOGY | Facility: CLINIC | Age: 49
End: 2019-11-12

## 2019-11-12 VITALS
SYSTOLIC BLOOD PRESSURE: 122 MMHG | WEIGHT: 223.6 LBS | DIASTOLIC BLOOD PRESSURE: 84 MMHG | HEIGHT: 65 IN | TEMPERATURE: 97.8 F | BODY MASS INDEX: 37.25 KG/M2

## 2019-11-12 DIAGNOSIS — R79.89 LFT ELEVATION: ICD-10-CM

## 2019-11-12 DIAGNOSIS — R12 HEARTBURN: ICD-10-CM

## 2019-11-12 DIAGNOSIS — R19.8 ALTERNATING CONSTIPATION AND DIARRHEA: ICD-10-CM

## 2019-11-12 DIAGNOSIS — K75.9 HEPATITIS: Primary | ICD-10-CM

## 2019-11-12 PROCEDURE — 99214 OFFICE O/P EST MOD 30 MIN: CPT | Performed by: NURSE PRACTITIONER

## 2019-11-12 RX ORDER — HYOSCYAMINE SULFATE 0.125 MG
0.12 TABLET ORAL 4 TIMES DAILY PRN
Qty: 120 TABLET | Refills: 5 | Status: SHIPPED | OUTPATIENT
Start: 2019-11-12

## 2019-11-12 RX ORDER — FAMOTIDINE 40 MG/1
40 TABLET, FILM COATED ORAL DAILY
Qty: 90 TABLET | Refills: 3 | Status: SHIPPED | OUTPATIENT
Start: 2019-11-12

## 2019-11-12 NOTE — PROGRESS NOTES
Chief Complaint   Patient presents with   • Hepatitis     HPI    Val Pettit is a  49 y.o. female here for a follow up visit for hepatitis.  This patient was seen in October has hospital follow-up.  She follows Dr. Mckeon, new to me.  She was discharged from [facility] hospital early October found to have elevated liver function test as well as fatty liver with 14 mm low-density right hepatic lobe lesion.  She was diagnosed with hepatitis possibly due to excessive Tylenol use as well as urinary tract infection.  She was also treated for kidney stones with lithotripsy and told to avoid Tylenol and NSAIDs.    Reviewed labs dated 10/22/2019: ALT 50 (down from 431 during hospital stay) otherwise normal liver function test.    Reviewed MRI dated 11/5/2019: Small benign-appearing cyst in the right lobe of the liver.  Minimal hepatic steatosis.    On visit today is a myriad of GI concerns.  She reports struggling with irritable bowel syndrome since her early 20s.  She is prone to cycles of diarrhea and constipation.  Triggers include lactose. She reports responding well to Carafate but many years ago.  She has not tried antispasmodics.  The longest she will go without a bowel movement is 3 days.  She has episodes of diarrhea about twice a week.  Denies rectal bleeding.  According to the patient her last colonoscopy was around age 26.  Denies personal history of colon polyps or family history colon cancer.    She still has some residual right upper quadrant pain but markedly improved since her hospital discharge.  She is avoiding Tylenol, NSAIDs, and alcohol.    She struggles a daily heartburn for which she takes over-the-counter Tums and [place].  Denies nausea or vomiting.  Denies dysphagia.  Appetite is good.  Her weight is stable.  BMI 37.8.    Past Medical History:   Diagnosis Date   • Arthritis    • GERD (gastroesophageal reflux disease)    • History of transfusion    • Hypotension    • IBS (irritable bowel  syndrome)    • Lactose intolerance Birth   • PONV (postoperative nausea and vomiting)        Past Surgical History:   Procedure Laterality Date   • ANKLE SURGERY Right    • BACK SURGERY      herniated disc removal   •  SECTION     • CHOLECYSTECTOMY     • COLONOSCOPY     • ENDOSCOPY     • FLEXIBLE SIGMOIDOSCOPY     • JOINT REPLACEMENT     • KNEE SURGERY Bilateral     x3 each   • SHOULDER SURGERY Left     x2   • SKIN BIOPSY     • UPPER GASTROINTESTINAL ENDOSCOPY     • URETEROSCOPY LASER LITHOTRIPSY WITH STENT INSERTION Right 10/9/2019    Procedure: RIGHT URETEROSCOPY;  Surgeon: Satish Sibley Jr., MD;  Location: [facility];  Service: Urology       Scheduled Meds:  Outpatient Encounter Medications as of 2019   Medication Sig Dispense Refill   • [DISCONTINUED] diphenhydrAMINE (BENADRYL) 50 MG capsule Take 50 mg by mouth At Night As Needed for Allergies or Sleep.     • famotidine (PEPCID) 40 MG tablet Take 1 tablet by mouth Daily. 90 tablet 3   • hyoscyamine (ANASPAZ,LEVSIN) 0.125 MG tablet Take 1 tablet by mouth 4 (Four) Times a Day As Needed for Cramping or Diarrhea. 120 tablet 5   • [DISCONTINUED] ondansetron (ZOFRAN) 4 MG tablet Take 1 tablet by mouth Every 8 (Eight) Hours As Needed for Nausea or Vomiting. 10 tablet 0     No facility-administered encounter medications on file as of 2019.        Continuous Infusions:  No current facility-administered medications for this visit.     PRN Meds:.    Allergies   Allergen Reactions   • Egg White [Albumen, Egg] Anaphylaxis   • Meperidine Other (See Comments)     Bradycardia & lightheadedness   • Erythromycin Diarrhea, Nausea And Vomiting and GI Intolerance   • Lactose Intolerance (Gi) Diarrhea   • Orange Juice Diarrhea       Social History     Socioeconomic History   • Marital status:      Spouse name: Not on file   • Number of children: Not on file   • Years of education: Not on file   • Highest education level: Not on file    Tobacco Use   • Smoking status: Never Smoker   • Smokeless tobacco: Never Used   Substance and Sexual Activity   • Alcohol use: No     Frequency: Never   • Drug use: No   • Sexual activity: Yes     Partners: Male     Birth control/protection: None     Comment: pt states no intercourse d/t tubes not tied       History reviewed. No pertinent family history.    Review of Systems   Constitutional: Negative for activity change, appetite change, fatigue, fever and unexpected weight change.   HENT: Negative for trouble swallowing.    Respiratory: Negative for apnea, cough, choking, chest tightness, shortness of breath and wheezing.    Cardiovascular: Negative for chest pain, palpitations and leg swelling.   Gastrointestinal: Positive for constipation and diarrhea. Negative for abdominal distention, abdominal pain, anal bleeding, blood in stool, nausea, rectal pain and vomiting.        + Heartburn       Vitals:    11/12/19 1034   BP: 122/84   Temp: 97.8 °F (36.6 °C)       Physical Exam   Constitutional: She is oriented to person, place, and time. She appears well-developed and well-nourished.   Eyes: Pupils are equal, round, and reactive to light.   Cardiovascular: Normal rate, regular rhythm and normal heart sounds.   Pulmonary/Chest: Effort normal and breath sounds normal. No respiratory distress. She has no wheezes.   Abdominal: Soft. Bowel sounds are normal. She exhibits no distension and no mass. There is no tenderness. There is no guarding. No hernia.   Musculoskeletal: Normal range of motion.   Neurological: She is alert and oriented to person, place, and time.   Skin: Skin is warm and dry. Capillary refill takes less than 2 seconds.   Psychiatric: She has a normal mood and affect. Her behavior is normal.       No images are attached to the encounter.    Val was seen today for hepatitis.    Diagnoses and all orders for this visit:    Hepatitis  -     Comprehensive Metabolic Panel; Future    LFT elevation  -    "  Comprehensive Metabolic Panel; Future    Alternating constipation and diarrhea    Heartburn    Other orders  -     famotidine (PEPCID) 40 MG tablet; Take 1 tablet by mouth Daily.  -     hyoscyamine (ANASPAZ,LEVSIN) 0.125 MG tablet; Take 1 tablet by mouth 4 (Four) Times a Day As Needed for Cramping or Diarrhea.      Impression:    Pleasant 49-year-old female seen today in follow-up.  She was hospitalized last month for hepatitis possibly drug-induced.  She had significant improvement in her liver function tests.  At this point recommend she continue to avoid NSAIDs, Tylenol, and alcohol.  Follow-up LFTs in 6 weeks.  I did review follow-up MRI at length with the patient and her .    Regarding heartburn, recommend she start Pepcid 40 mg once daily.  I did reinforce GERD diet and lifestyle modifications at length.    Regarding alternating constipation and diarrhea.  Recommend trial of antispasmodics.  Avoid known triggers such as lactose.  Consider probiotics.    Based on her age she is due for colon cancer screening and I recommend colonoscopy with Dr. Mckeon but patient would like to \" think about it.\"  Apparently she did not have a pleasant experience on her last colonoscopy approximately 20 years ago.  We discussed potential bowel preps that are low volume as well as improvements in sedation techniques.    Further recommendations pending follow-up labs in 6 weeks.    Return to clinic 6 months.          "

## 2019-12-23 ENCOUNTER — RESULTS ENCOUNTER (OUTPATIENT)
Dept: GASTROENTEROLOGY | Facility: CLINIC | Age: 49
End: 2019-12-23

## 2019-12-23 DIAGNOSIS — K75.9 HEPATITIS: ICD-10-CM

## 2019-12-23 DIAGNOSIS — R79.89 LFT ELEVATION: ICD-10-CM

## 2019-12-23 LAB
ALBUMIN SERPL-MCNC: 4.3 G/DL (ref 3.5–5.2)
ALBUMIN/GLOB SERPL: 1.7 G/DL
ALP SERPL-CCNC: 86 U/L (ref 39–117)
ALT SERPL-CCNC: 24 U/L (ref 1–33)
AST SERPL-CCNC: 21 U/L (ref 1–32)
BILIRUB SERPL-MCNC: 0.3 MG/DL (ref 0.2–1.2)
BUN SERPL-MCNC: 14 MG/DL (ref 6–20)
BUN/CREAT SERPL: 10.8 (ref 7–25)
CALCIUM SERPL-MCNC: 9.8 MG/DL (ref 8.6–10.5)
CHLORIDE SERPL-SCNC: 104 MMOL/L (ref 98–107)
CO2 SERPL-SCNC: 26.9 MMOL/L (ref 22–29)
CREAT SERPL-MCNC: 1.3 MG/DL (ref 0.57–1)
GLOBULIN SER CALC-MCNC: 2.5 GM/DL
GLUCOSE SERPL-MCNC: 90 MG/DL (ref 65–99)
POTASSIUM SERPL-SCNC: 4.5 MMOL/L (ref 3.5–5.2)
PROT SERPL-MCNC: 6.8 G/DL (ref 6–8.5)
SODIUM SERPL-SCNC: 143 MMOL/L (ref 136–145)

## 2019-12-26 NOTE — PROGRESS NOTES
Please notify Vla that lab work shows normal liver function test.  She does have an elevated creatinine.  I recommend she follow-up with her family care provider for further evaluation of kidney function.

## 2020-01-23 ENCOUNTER — TELEPHONE (OUTPATIENT)
Dept: GASTROENTEROLOGY | Facility: CLINIC | Age: 50
End: 2020-01-23

## 2020-01-23 NOTE — TELEPHONE ENCOUNTER
----- Message from ZAID Monique sent at 12/26/2019  8:54 AM EST -----  Please notify Val that lab work shows normal liver function test.  She does have an elevated creatinine.  I recommend she follow-up with her family care provider for further evaluation of kidney function.

## 2020-01-29 NOTE — TELEPHONE ENCOUNTER
Scott Boyle RegSched Rep  [facility] [address] [place]   Phone Number: [phone]             Pt called back.

## 2020-01-29 NOTE — TELEPHONE ENCOUNTER
Patient called, advised as per Kay's note. She states she is followed by a nephrologist and will consult him regarding her test results.

## 2020-11-10 ENCOUNTER — OFFICE VISIT (OUTPATIENT)
Dept: GASTROENTEROLOGY | Facility: CLINIC | Age: 50
End: 2020-11-10

## 2020-11-10 VITALS — HEIGHT: 65 IN | WEIGHT: 227.6 LBS | BODY MASS INDEX: 37.92 KG/M2

## 2020-11-10 DIAGNOSIS — R12 HEARTBURN: ICD-10-CM

## 2020-11-10 DIAGNOSIS — R79.89 LFT ELEVATION: Primary | ICD-10-CM

## 2020-11-10 DIAGNOSIS — R19.8 ALTERNATING CONSTIPATION AND DIARRHEA: ICD-10-CM

## 2020-11-10 PROCEDURE — 99204 OFFICE O/P NEW MOD 45 MIN: CPT | Performed by: INTERNAL MEDICINE

## 2020-11-10 NOTE — PROGRESS NOTES
Chief Complaint   Patient presents with   • Hepatic Disease   • Irritable Bowel Syndrome       Val Pettit is a 50 y.o. female who presents with heartburn IBS    50-year-old that I met a year ago with hepatitis of unknown etiology, 1 month later her liver tests were normal presumably it was a medication side effect possibly an herbal medication, or 8 virus that resolved on its own  She has no right upper quadrant pain jaundice or any symptoms of liver disease at this time  She does have chronic IBS-D which is well controlled with Levsin diet and IBgard  She is due for colonoscopy at this time for screening, I could rule out microscopic colitis also  She has chronic GERD well controlled with Pepcid as needed  No dysphagia, weight loss or vomiting  She is never had a screening EGD      Past Medical History:   Diagnosis Date   • Abnormal LFTs    • Arthritis    • GERD (gastroesophageal reflux disease)    • History of transfusion    • Hypotension    • IBS (irritable bowel syndrome)    • Lactose intolerance Birth   • PONV (postoperative nausea and vomiting)        Past Surgical History:   Procedure Laterality Date   • ANKLE SURGERY Right    • BACK SURGERY      herniated disc removal   •  SECTION     • CHOLECYSTECTOMY     • COLONOSCOPY     • ENDOSCOPY     • FLEXIBLE SIGMOIDOSCOPY     • JOINT REPLACEMENT     • KNEE SURGERY Bilateral     x3 each   • SHOULDER SURGERY Left     x2   • SKIN BIOPSY     • UPPER GASTROINTESTINAL ENDOSCOPY     • URETEROSCOPY LASER LITHOTRIPSY WITH STENT INSERTION Right 10/9/2019    Procedure: RIGHT URETEROSCOPY;  Surgeon: Satish Sibley Jr., MD;  Location: [facility];  Service: Urology         Current Outpatient Medications:   •  famotidine (PEPCID) 40 MG tablet, Take 1 tablet by mouth Daily. (Patient taking differently: Take 40 mg by mouth As Needed.), Disp: 90 tablet, Rfl: 3  •  hyoscyamine (ANASPAZ,LEVSIN) 0.125 MG tablet, Take 1 tablet by mouth 4 (Four) Times a  Day As Needed for Cramping or Diarrhea., Disp: 120 tablet, Rfl: 5    Allergies   Allergen Reactions   • Egg White [Albumen, Egg] Anaphylaxis   • Meperidine Other (See Comments)     Bradycardia & lightheadedness   • Erythromycin Diarrhea, Nausea And Vomiting and GI Intolerance   • Lactose Intolerance (Gi) Diarrhea   • Orange Juice Diarrhea       Social History     Socioeconomic History   • Marital status:      Spouse name: Not on file   • Number of children: Not on file   • Years of education: Not on file   • Highest education level: Not on file   Tobacco Use   • Smoking status: Never Smoker   • Smokeless tobacco: Never Used   Substance and Sexual Activity   • Alcohol use: No     Frequency: Never   • Drug use: No   • Sexual activity: Yes     Partners: Male     Birth control/protection: None     Comment: pt states no intercourse d/t tubes not tied       No family history on file.    Review of Systems   Gastrointestinal: Positive for abdominal distention, abdominal pain and diarrhea. Negative for vomiting.   All other systems reviewed and are negative.      There were no vitals filed for this visit.    Physical Exam  Vitals signs and nursing note reviewed.   Constitutional:       Appearance: She is well-developed.   HENT:      Head: Normocephalic and atraumatic.   Eyes:      Pupils: Pupils are equal, round, and reactive to light.   Cardiovascular:      Rate and Rhythm: Normal rate and regular rhythm.      Heart sounds: Normal heart sounds.   Pulmonary:      Effort: Pulmonary effort is normal.      Breath sounds: Normal breath sounds.   Abdominal:      General: Bowel sounds are normal. There is no distension or abdominal bruit.      Palpations: Abdomen is soft. Abdomen is not rigid. There is no shifting dullness, fluid wave, mass or pulsatile mass.      Tenderness: There is no abdominal tenderness. There is no guarding.      Hernia: No hernia is present.   Musculoskeletal: Normal range of motion.   Skin:      General: Skin is warm and dry.   Neurological:      Mental Status: She is alert and oriented to person, place, and time.   Psychiatric:         Behavior: Behavior normal.         Thought Content: Thought content normal.         Problem list    Acute hepatitis, likely Dili, or transient viral infection, all symptoms resolved no issues with elevated liver test this year  Irritable bowel syndrome likely, diarrhea abdominal pain bloating  In need of screening colonoscopy  Heartburn    Assessment/Plan    Schedule EGD and colonoscopy for screening, I would probably biopsy the colon for microscopic colitis at her colonoscopy session  Continue Pepcid as needed  Continue Levsin as needed  Continue IBgard as needed  Low FODMAP diet  Antireflux lifestyle discussed

## 2021-04-28 ENCOUNTER — TRANSCRIBE ORDERS (OUTPATIENT)
Dept: SLEEP MEDICINE | Facility: HOSPITAL | Age: 51
End: 2021-04-28

## 2021-04-28 DIAGNOSIS — Z01.818 OTHER SPECIFIED PRE-OPERATIVE EXAMINATION: Primary | ICD-10-CM

## 2021-05-14 ENCOUNTER — APPOINTMENT (OUTPATIENT)
Dept: LAB | Facility: HOSPITAL | Age: 51
End: 2021-05-14

## 2021-08-12 ENCOUNTER — TELEPHONE (OUTPATIENT)
Dept: GASTROENTEROLOGY | Facility: CLINIC | Age: 51
End: 2021-08-12

## 2024-04-11 ENCOUNTER — PATIENT ROUNDING (BHMG ONLY) (OUTPATIENT)
Dept: FAMILY MEDICINE CLINIC | Facility: CLINIC | Age: 54
End: 2024-04-11
Payer: COMMERCIAL

## 2024-04-11 ENCOUNTER — OFFICE VISIT (OUTPATIENT)
Dept: FAMILY MEDICINE CLINIC | Facility: CLINIC | Age: 54
End: 2024-04-11
Payer: COMMERCIAL

## 2024-04-11 VITALS
DIASTOLIC BLOOD PRESSURE: 82 MMHG | OXYGEN SATURATION: 94 % | HEIGHT: 65 IN | RESPIRATION RATE: 20 BRPM | SYSTOLIC BLOOD PRESSURE: 128 MMHG | WEIGHT: 246 LBS | BODY MASS INDEX: 40.98 KG/M2 | TEMPERATURE: 97.1 F | HEART RATE: 96 BPM

## 2024-04-11 DIAGNOSIS — Z13.0 SCREENING FOR DEFICIENCY ANEMIA: ICD-10-CM

## 2024-04-11 DIAGNOSIS — Z13.89 SCREENING FOR HEMATURIA OR PROTEINURIA: ICD-10-CM

## 2024-04-11 DIAGNOSIS — Z13.220 SCREENING FOR HYPERLIPIDEMIA: ICD-10-CM

## 2024-04-11 DIAGNOSIS — Z12.11 SCREENING FOR COLON CANCER: ICD-10-CM

## 2024-04-11 DIAGNOSIS — L30.9 DERMATITIS: Primary | ICD-10-CM

## 2024-04-11 PROCEDURE — 99204 OFFICE O/P NEW MOD 45 MIN: CPT | Performed by: NURSE PRACTITIONER

## 2024-04-11 RX ORDER — CONJUGATED ESTROGENS 0.62 MG/G
CREAM VAGINAL
COMMUNITY
Start: 2024-02-16

## 2024-04-11 NOTE — PROGRESS NOTES
"My name is Shar Jerome     I am the Practice Manager with   [facility] [address] [address] [phone]      I am messaging to officially welcome you to our practice and ask about your recent visit.     Tell me about your visit with us. What things went well?         We're always looking for ways to make our patients' experiences even better. Do you have recommendations on ways we may improve?       Overall were you satisfied with your first visit to our practice?        Is there anything else I can do for you?     Also, please note that you may receive a survey from \"[employer]\" please take time to fill that out, as it provides important feedback for our office.         Thank you, and have a great day.   "

## 2024-04-11 NOTE — PROGRESS NOTES
"Chief Complaint  Establish Care, Rash (Patient states that she has some spots on her arms that she wants looked at. ), and Annual Exam    Subjective        Val Pettit presents to [facility]  History of Present Illness  This is a 53 yo female here today to establish care. She has a hx of IBS and has been followed by Dr Oshea. She has had colonoscopy and found to have diverticulitis and states she keeps her symptoms under control with diet. She denies any abd pain, nausea or vomiting.     She has arthritits and has had surgery on shoulder and knees due to sports over the years. She follows Dr Rabago and takes tylenol for pain.     She is currently seeing Dr Good for left arm pain that causes numbness at times. She denies any new injury and doesn't have a lot of pain in her arm today in the office but it does bother her at times and does have difficulty straightening it out at times.     Her Obgyn is Dr rhiannon zamudio at [facility] last pap and mammo was June of last year. Her Mom had breath cancer at age 60.    She follows [facility] for hx of kidney stones. No issues reported     She does have some spots on her left arm that is concerning her today. They have been there for 2 months and are itchy at times. She denies any redness or drainage.      She does struggle with her weight due to lack of exercise with arthritis. She does watch her diet.      Objective   Vital Signs:  /82   Pulse 96   Temp 97.1 °F (36.2 °C)   Resp 20   Ht 163.8 cm (64.5\")   Wt 112 kg (246 lb)   SpO2 94%   BMI 41.57 kg/m²   Estimated body mass index is 41.57 kg/m² as calculated from the following:    Height as of this encounter: 163.8 cm (64.5\").    Weight as of this encounter: 112 kg (246 lb).       Class 3 Severe Obesity (BMI >=40). Obesity-related health conditions include the following: osteoarthritis. Obesity is newly identified. BMI is is above average; BMI management plan is " completed. We discussed portion control and increasing exercise.      Physical Exam  Vitals and nursing note reviewed.   HENT:      Head: Normocephalic.      Nose: Nose normal.   Eyes:      Pupils: Pupils are equal, round, and reactive to light.   Cardiovascular:      Rate and Rhythm: Normal rate and regular rhythm.      Pulses: Normal pulses.      Heart sounds: Normal heart sounds.   Pulmonary:      Effort: Pulmonary effort is normal. No respiratory distress.      Breath sounds: Normal breath sounds. No wheezing or rales.   Abdominal:      General: Bowel sounds are normal. There is no distension.      Tenderness: There is no abdominal tenderness.   Musculoskeletal:         General: No swelling.      Cervical back: Neck supple.      Right lower leg: No edema.      Left lower leg: No edema.   Skin:     General: Skin is warm and dry.      Comments: She does have 3 marble size area on her left forearm that is circular and scaly. No redness or signs of infection noted.    Neurological:      Mental Status: She is alert and oriented to person, place, and time.   Psychiatric:         Mood and Affect: Mood normal.        Result Review :                     Assessment and Plan     Diagnoses and all orders for this visit:    1. Dermatitis (Primary)    2. Screening for colon cancer  -     Cologuard - Stool, Per Rectum; Future    3. Screening for hyperlipidemia  -     Comprehensive Metabolic Panel  -     Lipid Panel    4. Screening for deficiency anemia  -     CBC & Differential    5. Screening for hematuria or proteinuria  -     Urinalysis With Culture If Indicated - Urine, Clean Catch    She is not fasting and will return for fasting labs  She is not considering doing another colonoscopy for for IBS in the furture but will do cologuard for colon cancer screening.   I will give her some steroid cream for the areas on her arm. IF this does not heal the area she will consider dermatology in the future.   She will continue to  follow her specialist.           Follow Up     No follow-ups on file.  Patient was given instructions and counseling regarding her condition or for health maintenance advice. Please see specific information pulled into the AVS if appropriate.

## 2024-04-17 RX ORDER — CLOBETASOL PROPIONATE 0.5 MG/G
1 CREAM TOPICAL 2 TIMES DAILY
Qty: 30 G | Refills: 1 | Status: SHIPPED | OUTPATIENT
Start: 2024-04-17

## 2024-09-19 ENCOUNTER — OFFICE VISIT (OUTPATIENT)
Dept: FAMILY MEDICINE CLINIC | Facility: CLINIC | Age: 54
End: 2024-09-19
Payer: COMMERCIAL

## 2024-09-19 VITALS
TEMPERATURE: 96.9 F | HEART RATE: 108 BPM | RESPIRATION RATE: 17 BRPM | DIASTOLIC BLOOD PRESSURE: 68 MMHG | WEIGHT: 243 LBS | HEIGHT: 65 IN | SYSTOLIC BLOOD PRESSURE: 118 MMHG | BODY MASS INDEX: 40.48 KG/M2 | OXYGEN SATURATION: 95 %

## 2024-09-19 DIAGNOSIS — L30.9 DERMATITIS: Primary | ICD-10-CM

## 2024-09-19 PROCEDURE — 99213 OFFICE O/P EST LOW 20 MIN: CPT | Performed by: NURSE PRACTITIONER

## 2024-09-19 RX ORDER — PREDNISONE 20 MG/1
TABLET ORAL
Qty: 20 TABLET | Refills: 0 | Status: SHIPPED | OUTPATIENT
Start: 2024-09-19

## 2025-03-12 ENCOUNTER — OFFICE VISIT (OUTPATIENT)
Dept: FAMILY MEDICINE CLINIC | Facility: CLINIC | Age: 55
End: 2025-03-12
Payer: COMMERCIAL

## 2025-03-12 VITALS
BODY MASS INDEX: 42.32 KG/M2 | HEIGHT: 65 IN | RESPIRATION RATE: 16 BRPM | WEIGHT: 254 LBS | SYSTOLIC BLOOD PRESSURE: 130 MMHG | OXYGEN SATURATION: 95 % | DIASTOLIC BLOOD PRESSURE: 82 MMHG | TEMPERATURE: 98 F | HEART RATE: 98 BPM

## 2025-03-12 DIAGNOSIS — R09.81 NASAL CONGESTION: Primary | ICD-10-CM

## 2025-03-12 RX ORDER — METHYLPREDNISOLONE ACETATE 40 MG/ML
40 INJECTION, SUSPENSION INTRA-ARTICULAR; INTRALESIONAL; INTRAMUSCULAR; SOFT TISSUE ONCE
Status: COMPLETED | OUTPATIENT
Start: 2025-03-12 | End: 2025-03-12

## 2025-03-12 RX ORDER — TRIAMCINOLONE ACETONIDE 1 MG/G
1 CREAM TOPICAL
COMMUNITY
Start: 2024-09-24

## 2025-03-12 RX ADMIN — METHYLPREDNISOLONE ACETATE 40 MG: 40 INJECTION, SUSPENSION INTRA-ARTICULAR; INTRALESIONAL; INTRAMUSCULAR; SOFT TISSUE at 13:19

## 2025-03-12 NOTE — PROGRESS NOTES
"Chief Complaint  Sore Throat (Patient has had symptoms for about 3 days ), Cough, and Nasal Congestion    Subjective        Val Pettit presents to [facility]  History of Present Illness  This is a 55-year-old female patient here today for evaluation of cough and nasal congestion.  Patient also reports sore throat.  Symptoms started 4 days ago.  She denies any chest pain or shortness of breath.  She is afebrile today.  She is using Zyrtec and Mucinex.      Objective   Vital Signs:  /82   Pulse 98   Temp 98 °F (36.7 °C) (Infrared)   Resp 16   Ht 163.8 cm (64.5\")   Wt 115 kg (254 lb)   SpO2 95%   BMI 42.93 kg/m²   Estimated body mass index is 42.93 kg/m² as calculated from the following:    Height as of this encounter: 163.8 cm (64.5\").    Weight as of this encounter: 115 kg (254 lb).            Physical Exam  Vitals and nursing note reviewed.   Constitutional:       Appearance: Normal appearance.   HENT:      Head: Normocephalic.      Right Ear: Tympanic membrane normal.      Left Ear: Tympanic membrane normal.      Nose: Congestion present.      Mouth/Throat:      Mouth: Mucous membranes are moist.      Pharynx: No oropharyngeal exudate or posterior oropharyngeal erythema.   Cardiovascular:      Rate and Rhythm: Normal rate and regular rhythm.      Pulses: Normal pulses.      Heart sounds: Normal heart sounds.   Musculoskeletal:      Cervical back: Neck supple.   Lymphadenopathy:      Cervical: No cervical adenopathy.   Skin:     General: Skin is warm and dry.   Neurological:      Mental Status: She is alert.        Result Review :                Assessment and Plan   Diagnoses and all orders for this visit:    1. Nasal congestion (Primary)  -     methylPREDNISolone acetate (DEPO-medrol) injection 40 mg    COVID and flu are all negative today.  Patient does have a lot of of increased drainage.  She is requesting Depo-Medrol injection today and I am willing to give " her that.  If symptoms do not improve she will follow-up         Follow Up   No follow-ups on file.  Patient was given instructions and counseling regarding her condition or for health maintenance advice. Please see specific information pulled into the AVS if appropriate.

## 2025-03-12 NOTE — PROGRESS NOTES
Administrations This Visit       methylPREDNISolone acetate (DEPO-medrol) injection 40 mg       Admin Date  03/12/2025 Action  Given Dose  40 mg Route  Intramuscular Documented By  Jonna Arcos MA                   Patient given 40 mg/ML of methylPREDNISolone acetate in the right ventrogluteal. Patient advised to wait in the office for 15 minutes for signs/symptoms of reaction. Patient v/u

## (undated) DEVICE — PRT BIOP SEALS

## (undated) DEVICE — PK URETSCP 40

## (undated) DEVICE — TIDISHIELD UROLOGY DRAIN BAGS FROSTY VINYL STERILE FITS SIEMENS UROSKOP ACCESS 20 PER CASE: Brand: TIDISHIELD

## (undated) DEVICE — GOWN,NON-REINFORCED,SIRUS,SET IN SLV,XL: Brand: MEDLINE

## (undated) DEVICE — GLV SURG BIOGEL LTX PF 7